# Patient Record
Sex: FEMALE | Race: WHITE | NOT HISPANIC OR LATINO | URBAN - METROPOLITAN AREA
[De-identification: names, ages, dates, MRNs, and addresses within clinical notes are randomized per-mention and may not be internally consistent; named-entity substitution may affect disease eponyms.]

---

## 2024-06-01 ENCOUNTER — HOSPITAL ENCOUNTER (INPATIENT)
Facility: HOSPITAL | Age: 54
LOS: 1 days | Discharge: PSYCHIATRIC OR SUBSTANCE USE FACILITY | DRG: 312 | End: 2024-06-04
Attending: STUDENT IN AN ORGANIZED HEALTH CARE EDUCATION/TRAINING PROGRAM | Admitting: INTERNAL MEDICINE
Payer: MEDICARE

## 2024-06-01 DIAGNOSIS — R07.9 CHEST PAIN: ICD-10-CM

## 2024-06-01 DIAGNOSIS — R55 SYNCOPE: ICD-10-CM

## 2024-06-01 DIAGNOSIS — R55 SYNCOPE AND COLLAPSE: Primary | ICD-10-CM

## 2024-06-01 LAB
ABORH RETYPE: NORMAL
ALBUMIN SERPL-MCNC: 3.5 G/DL (ref 3.5–5)
ALBUMIN/GLOB SERPL: 1.2 RATIO (ref 1.1–2)
ALP SERPL-CCNC: 86 UNIT/L (ref 40–150)
ALT SERPL-CCNC: 11 UNIT/L (ref 0–55)
ANION GAP SERPL CALC-SCNC: 9 MEQ/L
APTT PPP: 27.2 SECONDS (ref 23.2–33.7)
AST SERPL-CCNC: 14 UNIT/L (ref 5–34)
BASOPHILS # BLD AUTO: 0.02 X10(3)/MCL
BASOPHILS NFR BLD AUTO: 0.3 %
BILIRUB SERPL-MCNC: 0.5 MG/DL
BUN SERPL-MCNC: 15.4 MG/DL (ref 9.8–20.1)
CALCIUM SERPL-MCNC: 9 MG/DL (ref 8.4–10.2)
CHLORIDE SERPL-SCNC: 111 MMOL/L (ref 98–107)
CO2 SERPL-SCNC: 22 MMOL/L (ref 22–29)
CREAT SERPL-MCNC: 0.92 MG/DL (ref 0.55–1.02)
CREAT/UREA NIT SERPL: 17
EOSINOPHIL # BLD AUTO: 0.09 X10(3)/MCL (ref 0–0.9)
EOSINOPHIL NFR BLD AUTO: 1.6 %
ERYTHROCYTE [DISTWIDTH] IN BLOOD BY AUTOMATED COUNT: 13.6 % (ref 11.5–17)
ETHANOL SERPL-MCNC: <10 MG/DL
GFR SERPLBLD CREATININE-BSD FMLA CKD-EPI: >60 ML/MIN/1.73/M2
GLOBULIN SER-MCNC: 3 GM/DL (ref 2.4–3.5)
GLUCOSE SERPL-MCNC: 124 MG/DL (ref 74–100)
GROUP & RH: NORMAL
HCT VFR BLD AUTO: 41.7 % (ref 37–47)
HGB BLD-MCNC: 14.2 G/DL (ref 12–16)
IMM GRANULOCYTES # BLD AUTO: 0.01 X10(3)/MCL (ref 0–0.04)
IMM GRANULOCYTES NFR BLD AUTO: 0.2 %
INDIRECT COOMBS: NORMAL
INR PPP: 1.2
LACTATE SERPL-SCNC: 1.5 MMOL/L (ref 0.5–2.2)
LYMPHOCYTES # BLD AUTO: 1.08 X10(3)/MCL (ref 0.6–4.6)
LYMPHOCYTES NFR BLD AUTO: 18.7 %
MCH RBC QN AUTO: 30.9 PG (ref 27–31)
MCHC RBC AUTO-ENTMCNC: 34.1 G/DL (ref 33–36)
MCV RBC AUTO: 90.7 FL (ref 80–94)
MONOCYTES # BLD AUTO: 0.44 X10(3)/MCL (ref 0.1–1.3)
MONOCYTES NFR BLD AUTO: 7.6 %
NEUTROPHILS # BLD AUTO: 4.15 X10(3)/MCL (ref 2.1–9.2)
NEUTROPHILS NFR BLD AUTO: 71.6 %
NRBC BLD AUTO-RTO: 0 %
PLATELET # BLD AUTO: 224 X10(3)/MCL (ref 130–400)
PMV BLD AUTO: 9 FL (ref 7.4–10.4)
POTASSIUM SERPL-SCNC: 4 MMOL/L (ref 3.5–5.1)
PROT SERPL-MCNC: 6.5 GM/DL (ref 6.4–8.3)
PROTHROMBIN TIME: 15.2 SECONDS (ref 12.5–14.5)
RBC # BLD AUTO: 4.6 X10(6)/MCL (ref 4.2–5.4)
SODIUM SERPL-SCNC: 142 MMOL/L (ref 136–145)
SPECIMEN OUTDATE: NORMAL
WBC # SPEC AUTO: 5.79 X10(3)/MCL (ref 4.5–11.5)

## 2024-06-01 PROCEDURE — 96374 THER/PROPH/DIAG INJ IV PUSH: CPT

## 2024-06-01 PROCEDURE — 25500020 PHARM REV CODE 255: Performed by: STUDENT IN AN ORGANIZED HEALTH CARE EDUCATION/TRAINING PROGRAM

## 2024-06-01 PROCEDURE — 84484 ASSAY OF TROPONIN QUANT: CPT | Performed by: STUDENT IN AN ORGANIZED HEALTH CARE EDUCATION/TRAINING PROGRAM

## 2024-06-01 PROCEDURE — 86901 BLOOD TYPING SEROLOGIC RH(D): CPT | Performed by: STUDENT IN AN ORGANIZED HEALTH CARE EDUCATION/TRAINING PROGRAM

## 2024-06-01 PROCEDURE — 82077 ASSAY SPEC XCP UR&BREATH IA: CPT | Performed by: STUDENT IN AN ORGANIZED HEALTH CARE EDUCATION/TRAINING PROGRAM

## 2024-06-01 PROCEDURE — 96361 HYDRATE IV INFUSION ADD-ON: CPT

## 2024-06-01 PROCEDURE — 85025 COMPLETE CBC W/AUTO DIFF WBC: CPT | Performed by: STUDENT IN AN ORGANIZED HEALTH CARE EDUCATION/TRAINING PROGRAM

## 2024-06-01 PROCEDURE — 96375 TX/PRO/DX INJ NEW DRUG ADDON: CPT

## 2024-06-01 PROCEDURE — 83605 ASSAY OF LACTIC ACID: CPT | Performed by: STUDENT IN AN ORGANIZED HEALTH CARE EDUCATION/TRAINING PROGRAM

## 2024-06-01 PROCEDURE — 85610 PROTHROMBIN TIME: CPT | Performed by: STUDENT IN AN ORGANIZED HEALTH CARE EDUCATION/TRAINING PROGRAM

## 2024-06-01 PROCEDURE — 99285 EMERGENCY DEPT VISIT HI MDM: CPT | Mod: 25

## 2024-06-01 PROCEDURE — G0390 TRAUMA RESPONS W/HOSP CRITI: HCPCS

## 2024-06-01 PROCEDURE — 85730 THROMBOPLASTIN TIME PARTIAL: CPT | Performed by: STUDENT IN AN ORGANIZED HEALTH CARE EDUCATION/TRAINING PROGRAM

## 2024-06-01 PROCEDURE — 25000003 PHARM REV CODE 250: Performed by: STUDENT IN AN ORGANIZED HEALTH CARE EDUCATION/TRAINING PROGRAM

## 2024-06-01 PROCEDURE — 80053 COMPREHEN METABOLIC PANEL: CPT | Performed by: STUDENT IN AN ORGANIZED HEALTH CARE EDUCATION/TRAINING PROGRAM

## 2024-06-01 PROCEDURE — 93005 ELECTROCARDIOGRAM TRACING: CPT

## 2024-06-01 PROCEDURE — 83735 ASSAY OF MAGNESIUM: CPT | Performed by: INTERNAL MEDICINE

## 2024-06-01 PROCEDURE — 63600175 PHARM REV CODE 636 W HCPCS: Performed by: STUDENT IN AN ORGANIZED HEALTH CARE EDUCATION/TRAINING PROGRAM

## 2024-06-01 RX ORDER — ONDANSETRON HYDROCHLORIDE 2 MG/ML
4 INJECTION, SOLUTION INTRAVENOUS
Status: COMPLETED | OUTPATIENT
Start: 2024-06-01 | End: 2024-06-01

## 2024-06-01 RX ORDER — FENTANYL CITRATE 50 UG/ML
100 INJECTION, SOLUTION INTRAMUSCULAR; INTRAVENOUS
Status: COMPLETED | OUTPATIENT
Start: 2024-06-01 | End: 2024-06-01

## 2024-06-01 RX ORDER — SODIUM CHLORIDE 9 MG/ML
INJECTION, SOLUTION INTRAVENOUS
Status: COMPLETED | OUTPATIENT
Start: 2024-06-01 | End: 2024-06-01

## 2024-06-01 RX ADMIN — SODIUM CHLORIDE 1000 ML: 9 INJECTION, SOLUTION INTRAVENOUS at 09:06

## 2024-06-01 RX ADMIN — ONDANSETRON 4 MG: 2 INJECTION INTRAMUSCULAR; INTRAVENOUS at 11:06

## 2024-06-01 RX ADMIN — IOHEXOL 100 ML: 350 INJECTION, SOLUTION INTRAVENOUS at 10:06

## 2024-06-01 RX ADMIN — FENTANYL CITRATE 100 MCG: 50 INJECTION, SOLUTION INTRAMUSCULAR; INTRAVENOUS at 10:06

## 2024-06-02 LAB
AMPHET UR QL SCN: POSITIVE
AV INDEX (PROSTH): 0.64
AV MEAN GRADIENT: 4 MMHG
AV PEAK GRADIENT: 7 MMHG
AV VALVE AREA BY VELOCITY RATIO: 2.45 CM²
AV VALVE AREA: 2.42 CM²
AV VELOCITY RATIO: 0.65
BACTERIA #/AREA URNS AUTO: ABNORMAL /HPF
BARBITURATE SCN PRESENT UR: NEGATIVE
BENZODIAZ UR QL SCN: NEGATIVE
BILIRUB UR QL STRIP.AUTO: NEGATIVE
BSA FOR ECHO PROCEDURE: 1.78 M2
CANNABINOIDS UR QL SCN: NEGATIVE
CLARITY UR: CLEAR
COCAINE UR QL SCN: NEGATIVE
COLOR UR AUTO: ABNORMAL
CV ECHO LV RWT: 0.34 CM
DOP CALC AO PEAK VEL: 1.3 M/S
DOP CALC AO VTI: 27.8 CM
DOP CALC LVOT AREA: 3.8 CM2
DOP CALC LVOT DIAMETER: 2.2 CM
DOP CALC LVOT PEAK VEL: 0.84 M/S
DOP CALC LVOT STROKE VOLUME: 67.25 CM3
DOP CALC MV VTI: 25.2 CM
DOP CALCLVOT PEAK VEL VTI: 17.7 CM
E WAVE DECELERATION TIME: 180 MSEC
E/A RATIO: 0.94
E/E' RATIO: 7.24 M/S
ECHO LV POSTERIOR WALL: 0.82 CM (ref 0.6–1.1)
FENTANYL UR QL SCN: POSITIVE
FRACTIONAL SHORTENING: 22 % (ref 28–44)
GLUCOSE UR QL STRIP: NORMAL
HGB UR QL STRIP: NEGATIVE
HR MV ECHO: 77 BPM
INTERVENTRICULAR SEPTUM: 0.88 CM (ref 0.6–1.1)
KETONES UR QL STRIP: ABNORMAL
LEFT ATRIUM SIZE: 3.8 CM
LEFT INTERNAL DIMENSION IN SYSTOLE: 3.82 CM (ref 2.1–4)
LEFT VENTRICLE DIASTOLIC VOLUME INDEX: 64.74 ML/M2
LEFT VENTRICLE DIASTOLIC VOLUME: 112 ML
LEFT VENTRICLE MASS INDEX: 82 G/M2
LEFT VENTRICLE SYSTOLIC VOLUME INDEX: 36.2 ML/M2
LEFT VENTRICLE SYSTOLIC VOLUME: 62.7 ML
LEFT VENTRICULAR INTERNAL DIMENSION IN DIASTOLE: 4.89 CM (ref 3.5–6)
LEFT VENTRICULAR MASS: 141.43 G
LEUKOCYTE ESTERASE UR QL STRIP: 250
LV LATERAL E/E' RATIO: 5.85 M/S
LV SEPTAL E/E' RATIO: 9.5 M/S
LVOT MG: 1 MMHG
LVOT MV: 0.55 CM/S
MAGNESIUM SERPL-MCNC: 2.2 MG/DL (ref 1.6–2.6)
MDMA UR QL SCN: NEGATIVE
MUCOUS THREADS URNS QL MICRO: ABNORMAL /LPF
MV MEAN GRADIENT: 2 MMHG
MV PEAK A VEL: 0.81 M/S
MV PEAK E VEL: 0.76 M/S
MV PEAK GRADIENT: 4 MMHG
MV STENOSIS PRESSURE HALF TIME: 83 MS
MV VALVE AREA BY CONTINUITY EQUATION: 2.67 CM2
MV VALVE AREA P 1/2 METHOD: 2.65 CM2
NITRITE UR QL STRIP: NEGATIVE
OHS LV EJECTION FRACTION SIMPSONS BIPLANE MOD: 50 %
OPIATES UR QL SCN: NEGATIVE
PCP UR QL: NEGATIVE
PH UR STRIP: 7 [PH]
PH UR: 7 [PH] (ref 3–11)
PROT UR QL STRIP: NEGATIVE
RA PRESSURE ESTIMATED: 3 MMHG
RBC #/AREA URNS AUTO: ABNORMAL /HPF
SINUS: 3 CM
SP GR UR STRIP.AUTO: >1.05 (ref 1–1.03)
SPECIFIC GRAVITY, URINE AUTO (.000) (OHS): >1.05 (ref 1–1.03)
SQUAMOUS #/AREA URNS LPF: ABNORMAL /HPF
TDI LATERAL: 0.13 M/S
TDI SEPTAL: 0.08 M/S
TDI: 0.11 M/S
TRICUSPID ANNULAR PLANE SYSTOLIC EXCURSION: 2.06 CM
TROPONIN I SERPL-MCNC: <0.01 NG/ML (ref 0–0.04)
UROBILINOGEN UR STRIP-ACNC: NORMAL
WBC #/AREA URNS AUTO: ABNORMAL /HPF
Z-SCORE OF LEFT VENTRICULAR DIMENSION IN END DIASTOLE: 0.2
Z-SCORE OF LEFT VENTRICULAR DIMENSION IN END SYSTOLE: 2

## 2024-06-02 PROCEDURE — 80307 DRUG TEST PRSMV CHEM ANLYZR: CPT | Performed by: STUDENT IN AN ORGANIZED HEALTH CARE EDUCATION/TRAINING PROGRAM

## 2024-06-02 PROCEDURE — 63600175 PHARM REV CODE 636 W HCPCS: Performed by: INTERNAL MEDICINE

## 2024-06-02 PROCEDURE — 63600175 PHARM REV CODE 636 W HCPCS: Performed by: NURSE PRACTITIONER

## 2024-06-02 PROCEDURE — 87086 URINE CULTURE/COLONY COUNT: CPT | Performed by: STUDENT IN AN ORGANIZED HEALTH CARE EDUCATION/TRAINING PROGRAM

## 2024-06-02 PROCEDURE — 81001 URINALYSIS AUTO W/SCOPE: CPT | Mod: XB | Performed by: STUDENT IN AN ORGANIZED HEALTH CARE EDUCATION/TRAINING PROGRAM

## 2024-06-02 PROCEDURE — G0378 HOSPITAL OBSERVATION PER HR: HCPCS

## 2024-06-02 PROCEDURE — 25000003 PHARM REV CODE 250: Performed by: INTERNAL MEDICINE

## 2024-06-02 PROCEDURE — 25000003 PHARM REV CODE 250: Performed by: NURSE PRACTITIONER

## 2024-06-02 PROCEDURE — 87040 BLOOD CULTURE FOR BACTERIA: CPT | Performed by: INTERNAL MEDICINE

## 2024-06-02 RX ORDER — ACETAMINOPHEN 500 MG
1000 TABLET ORAL EVERY 6 HOURS PRN
Status: DISCONTINUED | OUTPATIENT
Start: 2024-06-02 | End: 2024-06-04 | Stop reason: HOSPADM

## 2024-06-02 RX ORDER — CLONIDINE HYDROCHLORIDE 0.1 MG/1
0.1 TABLET ORAL 2 TIMES DAILY
Status: DISCONTINUED | OUTPATIENT
Start: 2024-06-02 | End: 2024-06-03

## 2024-06-02 RX ORDER — IBUPROFEN 200 MG
16 TABLET ORAL
Status: DISCONTINUED | OUTPATIENT
Start: 2024-06-02 | End: 2024-06-04 | Stop reason: HOSPADM

## 2024-06-02 RX ORDER — MIRTAZAPINE 15 MG/1
15 TABLET, FILM COATED ORAL NIGHTLY PRN
Status: DISCONTINUED | OUTPATIENT
Start: 2024-06-02 | End: 2024-06-04 | Stop reason: HOSPADM

## 2024-06-02 RX ORDER — TALC
6 POWDER (GRAM) TOPICAL NIGHTLY PRN
Status: DISCONTINUED | OUTPATIENT
Start: 2024-06-02 | End: 2024-06-04 | Stop reason: HOSPADM

## 2024-06-02 RX ORDER — ENOXAPARIN SODIUM 100 MG/ML
40 INJECTION SUBCUTANEOUS EVERY 24 HOURS
Status: DISCONTINUED | OUTPATIENT
Start: 2024-06-02 | End: 2024-06-02

## 2024-06-02 RX ORDER — LUMATEPERONE 21 MG/1
21 CAPSULE ORAL
COMMUNITY

## 2024-06-02 RX ORDER — ASPIRIN 325 MG/1
100 TABLET, FILM COATED ORAL DAILY
COMMUNITY

## 2024-06-02 RX ORDER — CLOPIDOGREL BISULFATE 75 MG/1
75 TABLET ORAL DAILY
COMMUNITY

## 2024-06-02 RX ORDER — ALUMINUM HYDROXIDE, MAGNESIUM HYDROXIDE, AND SIMETHICONE 1200; 120; 1200 MG/30ML; MG/30ML; MG/30ML
30 SUSPENSION ORAL 4 TIMES DAILY PRN
Status: DISCONTINUED | OUTPATIENT
Start: 2024-06-02 | End: 2024-06-04 | Stop reason: HOSPADM

## 2024-06-02 RX ORDER — NALOXONE HCL 0.4 MG/ML
0.02 VIAL (ML) INJECTION
Status: DISCONTINUED | OUTPATIENT
Start: 2024-06-02 | End: 2024-06-04 | Stop reason: HOSPADM

## 2024-06-02 RX ORDER — ONDANSETRON HYDROCHLORIDE 2 MG/ML
4 INJECTION, SOLUTION INTRAVENOUS EVERY 4 HOURS PRN
Status: DISCONTINUED | OUTPATIENT
Start: 2024-06-02 | End: 2024-06-04 | Stop reason: HOSPADM

## 2024-06-02 RX ORDER — DIVALPROEX SODIUM 250 MG/1
250 TABLET, DELAYED RELEASE ORAL 2 TIMES DAILY
Status: DISCONTINUED | OUTPATIENT
Start: 2024-06-02 | End: 2024-06-04 | Stop reason: HOSPADM

## 2024-06-02 RX ORDER — THIAMINE HCL 100 MG
100 TABLET ORAL DAILY
Status: DISCONTINUED | OUTPATIENT
Start: 2024-06-02 | End: 2024-06-04 | Stop reason: HOSPADM

## 2024-06-02 RX ORDER — GLUCAGON 1 MG
1 KIT INJECTION
Status: DISCONTINUED | OUTPATIENT
Start: 2024-06-02 | End: 2024-06-04 | Stop reason: HOSPADM

## 2024-06-02 RX ORDER — DOXEPIN HYDROCHLORIDE 25 MG/1
25 CAPSULE ORAL NIGHTLY
COMMUNITY

## 2024-06-02 RX ORDER — DIVALPROEX SODIUM 250 MG/1
250 TABLET, DELAYED RELEASE ORAL 2 TIMES DAILY
COMMUNITY

## 2024-06-02 RX ORDER — CLONIDINE HYDROCHLORIDE 0.1 MG/1
0.05 TABLET ORAL 2 TIMES DAILY
COMMUNITY

## 2024-06-02 RX ORDER — SODIUM CHLORIDE 0.9 % (FLUSH) 0.9 %
10 SYRINGE (ML) INJECTION
Status: DISCONTINUED | OUTPATIENT
Start: 2024-06-02 | End: 2024-06-04 | Stop reason: HOSPADM

## 2024-06-02 RX ORDER — BISACODYL 10 MG/1
10 SUPPOSITORY RECTAL DAILY PRN
Status: DISCONTINUED | OUTPATIENT
Start: 2024-06-02 | End: 2024-06-04 | Stop reason: HOSPADM

## 2024-06-02 RX ORDER — HYDROCODONE BITARTRATE AND ACETAMINOPHEN 5; 325 MG/1; MG/1
1 TABLET ORAL EVERY 8 HOURS PRN
Status: DISCONTINUED | OUTPATIENT
Start: 2024-06-02 | End: 2024-06-04 | Stop reason: HOSPADM

## 2024-06-02 RX ORDER — MULTIVITAMIN
1 TABLET ORAL DAILY
COMMUNITY

## 2024-06-02 RX ORDER — OLANZAPINE 5 MG/1
10 TABLET, ORALLY DISINTEGRATING ORAL
Status: DISCONTINUED | OUTPATIENT
Start: 2024-06-02 | End: 2024-06-04 | Stop reason: HOSPADM

## 2024-06-02 RX ORDER — IBUPROFEN 200 MG
24 TABLET ORAL
Status: DISCONTINUED | OUTPATIENT
Start: 2024-06-02 | End: 2024-06-04 | Stop reason: HOSPADM

## 2024-06-02 RX ORDER — HYDROXYZINE PAMOATE 25 MG/1
50 CAPSULE ORAL EVERY 4 HOURS PRN
Status: DISCONTINUED | OUTPATIENT
Start: 2024-06-02 | End: 2024-06-04 | Stop reason: HOSPADM

## 2024-06-02 RX ORDER — AMOXICILLIN 250 MG
1 CAPSULE ORAL 2 TIMES DAILY PRN
Status: DISCONTINUED | OUTPATIENT
Start: 2024-06-02 | End: 2024-06-04 | Stop reason: HOSPADM

## 2024-06-02 RX ORDER — DOXEPIN HYDROCHLORIDE 25 MG/1
25 CAPSULE ORAL NIGHTLY
Status: DISCONTINUED | OUTPATIENT
Start: 2024-06-02 | End: 2024-06-04 | Stop reason: HOSPADM

## 2024-06-02 RX ADMIN — CEFTRIAXONE SODIUM 1 G: 1 INJECTION, POWDER, FOR SOLUTION INTRAMUSCULAR; INTRAVENOUS at 01:06

## 2024-06-02 RX ADMIN — ACETAMINOPHEN 1000 MG: 500 TABLET ORAL at 08:06

## 2024-06-02 RX ADMIN — DOXEPIN HYDROCHLORIDE 25 MG: 25 CAPSULE ORAL at 09:06

## 2024-06-02 RX ADMIN — ONDANSETRON 4 MG: 2 INJECTION INTRAMUSCULAR; INTRAVENOUS at 02:06

## 2024-06-02 RX ADMIN — ONDANSETRON 4 MG: 2 INJECTION INTRAMUSCULAR; INTRAVENOUS at 09:06

## 2024-06-02 RX ADMIN — DIVALPROEX SODIUM 250 MG: 250 TABLET, DELAYED RELEASE ORAL at 08:06

## 2024-06-02 RX ADMIN — THIAMINE HCL TAB 100 MG 100 MG: 100 TAB at 08:06

## 2024-06-02 RX ADMIN — DIVALPROEX SODIUM 250 MG: 250 TABLET, DELAYED RELEASE ORAL at 09:06

## 2024-06-02 RX ADMIN — HYDROCODONE BITARTRATE AND ACETAMINOPHEN 1 TABLET: 5; 325 TABLET ORAL at 09:06

## 2024-06-02 RX ADMIN — CLONIDINE HYDROCHLORIDE 0.1 MG: 0.1 TABLET ORAL at 08:06

## 2024-06-02 RX ADMIN — ONDANSETRON 4 MG: 2 INJECTION INTRAMUSCULAR; INTRAVENOUS at 07:06

## 2024-06-02 RX ADMIN — CLONIDINE HYDROCHLORIDE 0.1 MG: 0.1 TABLET ORAL at 09:06

## 2024-06-02 RX ADMIN — THERA TABS 1 TABLET: TAB at 08:06

## 2024-06-02 RX ADMIN — HYDROCODONE BITARTRATE AND ACETAMINOPHEN 1 TABLET: 5; 325 TABLET ORAL at 01:06

## 2024-06-02 NOTE — CONSULTS
"   Trauma Surgery   Activation Note    Patient Name: Kourtney Conley  MRN: [id number]   YOB: 1970  Date: 06/01/2024    LEVEL 2 TRAUMA     Subjective:   History of present illness: 54 year old female with history of PE presents as level 2 trauma activation after fall while on plavix. Per EMS patient stood to get her medications and lost consciousness. She fell face forward at ground level. She was found on the floor unresponsive for 2 minutes. The patient does not recall the mechanism of the fall. She reports associated neck pain, shoulder pain, headache, dizziness and back pain. C collar applied and patient received zofran on route.     Primary Survey:  A patent   B spontaneous   C Palpable pulses    D GCS 15(E 4, V 5, M 6)    E exposed, log-rolled and examined (see below)   F See below     VITAL SIGNS: 24 HR MIN & MAX LAST   Temp  Min: 98.5 °F (36.9 °C)  Max: 98.5 °F (36.9 °C)  98.5 °F (36.9 °C)   BP  Min: 124/82  Max: 132/86  124/82    Pulse  Min: 90  Max: 100  90    Resp  Min: 18  Max: 27  20    SpO2  Min: 98 %  Max: 99 %  98 %      HT: 5' 1" (154.9 cm)  WT: 73.9 kg (163 lb)  BMI: 30.8     FAST: deferred    Scheduled Meds:  Continuous Infusions:   sodium chloride 0.9%   Intravenous Code/Trauma/sedation Continuous Med 1,000 mL/hr at 06/01/24 2151 1,000 mL at 06/01/24 2151     PRN Meds:  Current Facility-Administered Medications:     sodium chloride 0.9%, , Intravenous, Code/Trauma/sedation Continuous Med    ROS: +back pain, myalgias, neck pain, dizziness, syncope, weakness, lightheaded, headache    Allergies: toradol, morphine, shellfish, sulfa, tramadol  PMH: PE  PSH: cervical fusion, cholecystectomy  Social history: Noncontributory  Objective:   Secondary Survey:   General: NAD  Neuro: GCS 15  HEENT:  hematoma to right forehead, PERRLA, c-collar  CV:  RR  Pulse: 2+ RP b/l, 2+ DP b/l   Resp/chest:  Non-labored breathing  GI:  Abdomen soft, non-tender, non-distended  :  deferred   Rectal: " deferred  Extremities: Moves all 4 spontaneously and purposefully, no obvious gross deformities.  Back/Spine: cervical and thoracic tenderness, no step offs or deformities. No lumbar spine tenderness, step off or deformity.   Skin/wounds:  Warm, well perfused    Labs:  WBC 5, H/H 14/41, Plt 224  INR 1.2  K 4, BUN:Cr 15/0.92  ETOH < 10  LA 1.5      Imaging:  XR pelvis:  Several metallic densities overlying the pelvis.  No fractures are seen     CXR:   No acute disease is seen     CT chest/AP:  1. No acute traumatic intrathoracic pathology identified. No acute traumatic intraabdominal or pelvic solid organ or bowel pathology identified. Details and other findings as discussed above.     CT cervical:  1. No acute cervical spine fracture dislocation or subluxation is seen.     CTH:  1. There is mild soft tissue swelling with hyperdense component in the right frontal scalp and supraorbital region denoting contusion hematoma.   2. No acute intracranial traumatic injury identified.   Assessment & Plan:   54 year old female that presents as level 2 trauma activation for fall on plavix after syncopal event. -The patient is afebrile and HDS. Labs/imaging reviewed. She has no traumatic injuries and no indication for admission to trauma service.   -Dispo per ED; to be admitted by medicine service for syncope workup.     Smooth Dc MD  [employer] General Surgery PGY-4

## 2024-06-02 NOTE — H&P
[facility] Hospital Medicine History & Physical Examination       Patient Name: Claudia Villasenor  MRN: [id number]  Patient Class: OP- Observation   Admission Date: 06/02/2024   Admitting Service: Hospital Medicine   Length of Stay: 0  Attending Physician: Dr. Baltazar   Primary Care Provider: No primary care provider on file.  Face-to-Face encounter date: 06/02/2024  Code Status: Full  Chief Complaint: No chief complaint on file.      Patient information was obtained from patient, patient's family, past medical records and ER records.    HISTORY OF PRESENT ILLNESS:   Claudia Villasenor is a 54 y.o. female with a PMHx of polysubstance abuse including methamphetamine and heroin, chronic neck pain, RLS, thyroid cancer s/p partial thyroidectomy, migraine and history of PE who presented to [facility] on 6/1/2024 via EMS from [facility] as a level 2 trauma activation following a fall that occurred prior to arrival.  Patient reportedly standing trying to get to her night medications when she spontaneously lost consciousness and fell face forward, unresponsive for 2 minutes.  Patient endorsed neck pain, shoulder pain, headache, dizziness and back pain following the fall.  Noted to be on Plavix.    Vital Signs upon presentation to the ED included /86, , RR 27, SpO2 99% on room air, temperature 98.5° F.  Labs notable for chloride 111, glucose 124.  Troponin undetectable, serum alcohol undetectable.  Lactic acid unremarkable.  CT head without contrast demonstrated mild soft tissue swelling with hyperdense component in the right frontal scalp and supraorbital region done noting contusion hematoma, no acute intracranial traumatic injury identified.  CT C-spine without contrast unremarkable.  CXR unremarkable.  Pelvic x-ray demonstrated similar overall metallic densities overlying the pelvis, no fracture seen.  CT chest abdomen and pelvis with IV contrast unremarkable.  She was seen  fentanyl 100 mcg IV, Zofran 4 mg IV and IV fluids in the ED. she was evaluated by Trauma Service and deemed appropriate for Medicine admission.  Admitted to hospital medicine service for further workup and management of care.    REVIEW OF SYSTEMS:   Except as documented, all other systems reviewed and negative.    PAST MEDICAL HISTORY:   History of polysubstance abuse  Chronic neck pain  RLS   History of thyroid cancer s/p partial thyroidectomy  Migraine   History of PE    PAST SURGICAL HISTORY:   left knee replacement  Cervical spine surgery x5   Partial thyroidectomy   Bilateral carpal tunnel release   Cholecystectomy  Appendectomy   Bilateral inguinal hernia repair    FAMILY HISTORY:   Mother: Bipolar disorder, depression   Dad:  Polysubstance abuse, alcoholism, PTSD and depression    SOCIAL HISTORY:   Denied alcohol, tobacco. Heroin and meth use.     SCREENING FOR SOCIAL DRIVERS FOR HEALTH:   Patient screened for food insecurity, housing instability, transportation needs, utility difficulties, and interpersonal safety (select all that apply as identified as concern):  [x]Housing or Food  []Transportation Needs  []Utility Difficulties  []Interpersonal safety  []None    ALLERGIES:   Ketorolac, Morpholine analogues, Shellfish containing products, Sulfa (sulfonamide antibiotics), and Tramadol    HOME MEDICATIONS:     Prior to Admission medications    Medication Sig Start Date End Date Taking? Authorizing Provider   cloNIDine (CATAPRES) 0.1 MG tablet Take 0.05 mg by mouth 2 (two) times daily.    Provider, Historical   clopidogreL (PLAVIX) 75 mg tablet Take 75 mg by mouth once daily.    Provider, Historical   divalproex (DEPAKOTE) 250 MG EC tablet Take 250 mg by mouth 2 (two) times a day.    Provider, Historical   doxepin (SINEQUAN) 25 MG capsule Take 25 mg by mouth every evening.    Provider, Historical   lumateperone (CAPLYTA) 21 mg Cap Take 21 mg by mouth.    Provider, Historical   multivitamin (THERAGRAN) per  tablet Take 1 tablet by mouth once daily.    Provider, Historical   thiamine mononitrate, vit B1, (VITAMIN B-1, MONONITRATE,) 100 mg Tab Take 100 mg by mouth once daily.    Provider, Historical     ________________________________________________________________________  INPATIENT LIST OF MEDICATIONS     Current Facility-Administered Medications:     acetaminophen tablet 1,000 mg, 1,000 mg, Oral, Q6H PRN, Renetta Rodriguez, LENA    aluminum-magnesium hydroxide-simethicone 200-200-20 mg/5 mL suspension 30 mL, 30 mL, Oral, QID PRN, Renetta Rodriguez, LENA    bisacodyL suppository 10 mg, 10 mg, Rectal, Daily PRN, Renetta Rodriguez, LENA    cloNIDine tablet 0.1 mg, 0.1 mg, Oral, BID, Reentta Rodriguez FNP    dextrose 10% bolus 125 mL 125 mL, 12.5 g, Intravenous, PRN, Renetta Rodriguez, FNP    dextrose 10% bolus 250 mL 250 mL, 25 g, Intravenous, PRN, Renetta Rodriguez FNP    divalproex EC tablet 250 mg, 250 mg, Oral, BID, Renetta Rodriguez FNP    doxepin capsule 25 mg, 25 mg, Oral, QHS, Renetta Rodriguez FNP    enoxaparin injection 40 mg, 40 mg, Subcutaneous, Q24H (prophylaxis, 1700), Renetta Rodriguez FNP    glucagon (human recombinant) injection 1 mg, 1 mg, Intramuscular, PRN, Renetta Rodriguez FNP    glucose chewable tablet 16 g, 16 g, Oral, PRN, Renetta Rodriguez, LENA    glucose chewable tablet 24 g, 24 g, Oral, PRN, Renetta Rodriguez, LENA    hydrOXYzine pamoate capsule 50 mg, 50 mg, Oral, Q4H PRN, Renetta Rodriguez, LENA    lumateperone Cap 21 mg, 21 mg, Oral, Daily, Renetta Rodriguez FNP    melatonin tablet 6 mg, 6 mg, Oral, Nightly PRN, Renetta Rodriguez FNP    mirtazapine tablet 15 mg, 15 mg, Oral, Nightly PRN, Renetta Rodriguez FNP    multivitamin tablet, 1 tablet, Oral, Daily, Renetta Rodriguez FNP    naloxone 0.4 mg/mL injection 0.02 mg, 0.02 mg, Intravenous, PRN, Renetta Rodriguez FNP    OLANZapine zydis disintegrating tablet 10 mg, 10 mg, Oral, Q2H PRN, Renetta Rodriguez FNP    ondansetron injection  4 mg, 4 mg, Intravenous, Q4H PRN, Meet Rodriguezy L, FNP, 4 mg at 06/02/24 0720    senna-docusate 8.6-50 mg per tablet 1 tablet, 1 tablet, Oral, BID PRN, Molbert, Renetta L, FNP    sodium chloride 0.9% flush 10 mL, 10 mL, Intravenous, PRN, Molbert, Renetta L, FNP    thiamine tablet 100 mg, 100 mg, Oral, Daily, Molbert, Renetta L, FNP    Current Outpatient Medications:     cloNIDine (CATAPRES) 0.1 MG tablet, Take 0.05 mg by mouth 2 (two) times daily., Disp: , Rfl:     clopidogreL (PLAVIX) 75 mg tablet, Take 75 mg by mouth once daily., Disp: , Rfl:     divalproex (DEPAKOTE) 250 MG EC tablet, Take 250 mg by mouth 2 (two) times a day., Disp: , Rfl:     doxepin (SINEQUAN) 25 MG capsule, Take 25 mg by mouth every evening., Disp: , Rfl:     lumateperone (CAPLYTA) 21 mg Cap, Take 21 mg by mouth., Disp: , Rfl:     multivitamin (THERAGRAN) per tablet, Take 1 tablet by mouth once daily., Disp: , Rfl:     thiamine mononitrate, vit B1, (VITAMIN B-1, MONONITRATE,) 100 mg Tab, Take 100 mg by mouth once daily., Disp: , Rfl:     Scheduled Meds:   cloNIDine  0.1 mg Oral BID    divalproex  250 mg Oral BID    doxepin  25 mg Oral QHS    enoxparin  40 mg Subcutaneous Q24H (prophylaxis, 1700)    lumateperone  21 mg Oral Daily    multivitamin  1 tablet Oral Daily    thiamine  100 mg Oral Daily     Continuous Infusions:  PRN Meds:.  Current Facility-Administered Medications:     acetaminophen, 1,000 mg, Oral, Q6H PRN    aluminum-magnesium hydroxide-simethicone, 30 mL, Oral, QID PRN    bisacodyL, 10 mg, Rectal, Daily PRN    dextrose 10%, 12.5 g, Intravenous, PRN    dextrose 10%, 25 g, Intravenous, PRN    glucagon (human recombinant), 1 mg, Intramuscular, PRN    glucose, 16 g, Oral, PRN    glucose, 24 g, Oral, PRN    hydrOXYzine pamoate, 50 mg, Oral, Q4H PRN    melatonin, 6 mg, Oral, Nightly PRN    mirtazapine, 15 mg, Oral, Nightly PRN    naloxone, 0.02 mg, Intravenous, PRN    OLANZapine zydis, 10 mg, Oral, Q2H PRN    ondansetron, 4 mg,  Intravenous, Q4H PRN    senna-docusate 8.6-50 mg, 1 tablet, Oral, BID PRN    sodium chloride 0.9%, 10 mL, Intravenous, PRN    PHYSICAL EXAM:     VITAL SIGNS: 24 HRS MIN & MAX LAST   Temp  Min: 98.4 °F (36.9 °C)  Max: 98.5 °F (36.9 °C) 98.4 °F (36.9 °C)   BP  Min: 123/77  Max: 150/87 (!) 131/95   Pulse  Min: 70  Max: 100  72   Resp  Min: 13  Max: 28 (!) 22   SpO2  Min: 97 %  Max: 100 % 100 %       General appearance: Well-developed female in no apparent distress.  HENT: Right forehead and orbital hematoma  Eyes: Normal extraocular movements.   Neck: Supple.   Lungs: Clear to auscultation bilaterally.   Heart: Regular rate and rhythm. S1 and S2 present. No pedal edema.  Abdomen: Soft, non-distended, non-tender.  Extremities: No cyanosis, clubbing, or edema.  Skin: No Rash.   Neuro: Motor and sensory exams grossly intact.   Psych/mental status: Awake and alert. Appropriate mood and affect. Responds appropriately to questions.     LABS AND IMAGING:     Recent Labs   Lab 06/01/24  2151   WBC 5.79   RBC 4.60   HGB 14.2   HCT 41.7   MCV 90.7   MCH 30.9   MCHC 34.1   RDW 13.6      MPV 9.0       Recent Labs   Lab 06/01/24  2151      K 4.0   *   CO2 22   BUN 15.4   CREATININE 0.92   CALCIUM 9.0   ALBUMIN 3.5   ALKPHOS 86   ALT 11   AST 14   BILITOT 0.5       Microbiology Results (last 7 days)       ** No results found for the last 168 hours. **             X-Ray Pelvis Routine AP  Narrative: EXAMINATION:  XR PELVIS ROUTINE AP    CLINICAL HISTORY:  r/o bleeding or hemorrhage;    TECHNIQUE:  AP view of the pelvis was performed.    COMPARISON:  None.    FINDINGS:  There are no fractures seen.  There is no dislocation.  There is several metallic densities overlying the pelvis.  I do not know if these are in or on the patient.  Impression: Several metallic densities overlying the pelvis.  No fractures are seen    Electronically signed by: Jose Ontiveros MD  Date:    06/01/2024  Time:    22:54  X-Ray Chest 1  View  Narrative: EXAMINATION:  XR CHEST 1 VIEW    CLINICAL HISTORY:  r/o bleeding or hemorrhage;    TECHNIQUE:  Single frontal view of the chest was performed.    COMPARISON:  None    FINDINGS:  Lungs are clear.  Heart size is within normal limits.  Costophrenic angles are clear.  Impression: No acute disease is seen    Electronically signed by: Jose Ontiveros MD  Date:    06/01/2024  Time:    22:47  CT Chest Abdomen Pelvis With IV Contrast (XPD) NO Oral Contrast  START OF REPORT:  Technique: CT Scan of the chest abdomen and pelvis was performed with intravenous contrast with axial as well as sagittal and, coronal images.    Dosage Information: Automated Exposure Control was utilized.    Comparison: None.    Clinical History: Lv 2 trauma- GL fall, + thinners, head, neck pain.    Findings:  Soft Tissues: Unremarkable.  Lines and Tubes: None.  Neck: The visualized soft tissues of the neck appear unremarkable. The left thyroid gland is not identified. Correlate with clinical findings and prior surgical intervention. There is a small hypodense nodule in the right thyroid lobe (series 2 image 7). Correlate with clinical and laboratory findings as regards additional evaluation such as ultrasound.  Mediastinum: The mediastinal structures are within normal limits.  Heart: The heart size is within normal limits.  Aorta: Unremarkable appearing aorta. No aortic dissection or aneurysm is seen.  Lungs: Subtle streaky linear opacity is seen predominantly in the dependent portions at the lung bases consistent with scarring and subsegmental atelectasis. Otherwise clear lungs with no areas of focal infiltrate or consolidation.  Pleura: No effusions or pneumothorax are identified.  Bony Structures:  Spine: Spondylolytic changes are seen in the thoracic spine.  Ribs: No rib fractures are identified.  Abdomen:  Abdominal Wall: No abdominal wall pathology is seen.  Liver: The liver appears unremarkable.  Biliary System: The extra hepatic  biliary system appears prominent which may reflect prior obstructive physiology in this patient status post cholecystectomy.  Gallbladder: Surgical clips are seen in the gallbladder fossa which may reflect prior cholecystectomy.  Pancreas: The pancreas appears unremarkable.  Spleen: The spleen appears unremarkable.  Adrenals: The adrenal glands appear unremarkable.  Kidneys: The kidneys appear unremarkable with no stones cysts masses or hydronephrosis with IV contrast decreasing sensitivity and specificity for stones.  Aorta: The abdominal aorta appears unremarkable.  IVC: Unremarkable.  Bowel:  Esophagus: The visualized esophagus appears unremarkable.  Stomach: The stomach appears unremarkable.  Duodenum: Unremarkable appearing duodenum.  Small Bowel: The small bowel appears unremarkable.  Colon: Nondistended.  Appendix: The appendix is not identified but no inflammatory changes are seen in the right lower quadrant to suggest appendicitis.  Peritoneum: No intraperitoneal free air or ascites is seen.    Pelvis:  Bladder: The bladder is nondistended but appears otherwise unremarkable.  Female:  Uterus: The uterus appears unremarkable for age.  Ovaries: No adnexal masses are seen.    Bony structures:  Dorsal Spine: There is spondylosis of the visualized dorsal spine.  Bony Pelvis: The visualized bony structures of the pelvis appear unremarkable.    Miscellaneous: Note is made of an orthopedic hardwarde in the lower cervical region.    Impression:  1. No acute traumatic intrathoracic pathology identified. No acute traumatic intraabdominal or pelvic solid organ or bowel pathology identified. Details and other findings as discussed above.  CT Cervical Spine Without Contrast  START OF REPORT:  Technique: CT of the cervical spine was performed without intravenous contrast with axial as well as sagittal and coronal images.    Comparison: None.    Dosage Information: Automated exposure control was utilized.    Clinical  history: Lv 2 trauma- GL fall, + thinners, head, neck pain.    Findings:  Lung apices: Chest CT findings discussed separately.  Spine:  Spinal canal: The spinal canal appears unremarkable.  Mineralization: Within normal limits for age.  Rotation: No significant rotation is seen.  Scoliosis: No significant scoliosis is seen.  Listhesis: No significant listhesis is identified.  Lordosis: The cervical lordosis is maintained.  Intervertebral disc spaces: The rest of the intervertebral discs are preserved.  Osteophytes: Moderate multilevel endplate osteophytes are seen.  Endplate Sclerosis: No significant endplate sclerosis is seen.  Uncovertebral degenerative changes: Mild multilevel uncovertebral joint arthrosis is seen.  Facet degenerative changes: Mild multilevel facet degenerative changes are seen.  Calcifications: None.  Fractures: No acute cervical spine fracture dislocation or subluxation is seen.  Orthopedic Hardware: There is anterior orthopedic cervical fusion from C3 to C7.    Miscellaneous:  Mastoid air cells: The visualized mastoid air cells appear clear.  Soft Tissues: Unremarkable.    Impression:  1. No acute cervical spine fracture dislocation or subluxation is seen.  2. Degenerative and postsurgical changes and other details as above. Details and other findings as noted above.  CT Head Without Contrast  START OF REPORT:  Technique: CT of the head was performed without intravenous contrast with direct axial as well as coronal and sagittal reconstruction images.    Comparison: None.    Clinical history: Lv 2 trauma- GL fall, + thinners, head, neck pain.    FINDINGS:  Hemorrhage: No acute intracranial hemorrhage is seen.  CSF spaces: The ventricles, sulci and basal cisterns all appear mildly prominent global cerebral atrophy.  Brain parenchyma: Unremarkable with preservation of the grey white junction throughout.  Cerebellum: Unremarkable.  Vascular: Unremarkable venous sinuses. Subtle scattered  atheromatous calcification of the intracranial arteries is seen.  Sella and skull base: Unremarkable.  Cerebellopontine angles: Normal.  Herniation: None.  Intracranial calcifications: Incidental note is made of bilateral choroid plexus calcification. Incidental note is made of some pineal region calcification.  Calvarium: No acute linear or depressed skull fracture is seen.  Scalp: There is mild soft tissue swelling with hyperdense component in the right frontal scalp and supraorbital region denoting contusion hematoma.    Maxillofacial Structures:  Paranasal sinuses: The visualized paranasal sinuses appear clear with no mucoperiosteal thickening or air fluid levels identified.  Orbits: Unremarkable.  Zygomatic arches: Unremarkable.  Temporal bones and mastoids: Unremarkable.  TMJ: The mandibular condyles appear normally placed with respect to the mandibular fossa.    Impression:  1. There is mild soft tissue swelling with hyperdense component in the right frontal scalp and supraorbital region denoting contusion hematoma.  2. No acute intracranial traumatic injury identified. Details and other findings as noted above.        ASSESSMENT & PLAN:   Syncope and collapse   Facial contusions and hematoma s/p ground level fall     History:  History of polysubstance abuse, Chronic neck pain, RLS, History of thyroid cancer s/p partial thyroidectomy, Migraine, History of PE    Plan:  Fall precautions   Cardiac monitoring  Echocardiogram and bilateral carotid ultrasound pending  Orthostatic VS  UA and UDS pending   Resume home medications as deemed appropriate once medication reconciliation is updated  Labs in AM    VTE Prophylaxis: SCDs    Discharge Planning and Disposition: GLENROY POSADAS, Rebekah Weathers NP have reviewed and discussed the case with Dr. Baltazar.  Please see the attending MD's addendum for further assessment and plan.    Rebekah Weathers [facility]  Department of [facility] Medicine   [facility]  [facility]   06/02/2024    This note was created with the assistance of [employer] Voice Recognition Software. There may be transcription errors as a result of using this technology, however minimal and effort has been made to assure accuracy of transcription, but any obvious errors or omissions should be clarified with the author of the document.    _______________________________________________________________________________  MD Addendum:  I, Dr. Baltazar, assumed care of this patient today   For the patient encounter, I performed the substantive portion of the visit, I reviewed the NP/PA documentation, treatment plan, and medical decision making.  I had face to face time with this patient     A. History:  54-year-old female with past medical history stated as above presenting from  behavioral health as level 2 trauma following a fall, there was a concern for loss of consciousness and being unresponsive.  UDS positive for amphetamines, fentanyl.  UA abnormal.  Rest of the labs look okay.  Imaging reports reviewed.FULL CODE    B. Physical exam:  Agree with the physical exam as above.  In no acute distress, hematoma of the forehead, chest, heart, abdomen, non concerning, able to move all extremities spontaneously.    C. Medical decision making:  Fall.    Facial contusions and hematoma from fall  Syncope and reported collapse  Polysubstance use  UTI  PMHx of polysubstance abuse including methamphetamine and heroin, chronic neck pain, RLS, thyroid cancer s/p partial thyroidectomy, migraine and history of PE    Plan   Analgesics p.r.n. fall precautions. Hold antiplatelets/anticoags if any, can resume in 3-5 days if hematoma stable  Follow up syncope workup, orthostatic vitals, carotid ultrasound, echocardiogram.  EKG  Continue to monitor on telemetry.  Polysubstance use could have played a role.  Monitor electrolytes.  Keep K above 4, Mag about 2.  Pending above workup, consider Cardiology eval accordingly  Has back  pain, initiated ceftriaxone for antibiotics-6/2, follow up urine culture.Monitor fever curve and WBC.  Continue supportive care and appropriate home medications pending med rec.  Discussed with the staff      DVT prophylaxis-SCDs      Anticipated discharge and disposition-tbd, pending syncope workup and finalization of urine cultures          All diagnosis and differential diagnosis have been reviewed; assessment and plan has been documented; I have personally reviewed the labs and test results that are presently available; I have reviewed the patients medication list; I have reviewed the consulting providers response and recommendations. I have reviewed or attempted to review medical records based upon their availability.    All of the patient and family questions have been addressed and answered. Patient's is agreeable to the above stated plan. I will continue to monitor closely and make adjustments to medical management as needed.      06/02/2024

## 2024-06-02 NOTE — ED PROVIDER NOTES
Encounter Date: 6/1/2024    SCRIBE #1 NOTE: I, Nohemi Sands, am scribing for, and in the presence of,  Daljit Au MD. I have scribed the following portions of the note - Other sections scribed: HPI, ROS, PE.       History   No chief complaint on file.    54 year old female with a history of PE and hernia surgery presents to ED from [facility] via EMS as a level 2 trauma activation for a fall on Plavix PTA. Per EMS, pt reports she was standing trying to get to her night medications when she spontaneously lost consciousness and fell face forward at ground level. She was found on the the floor unresponsive for ~2 minutes. Pt does not remember mechanism of fall. She reports associated neck pain, shoulder pain, headache, dizziness, and back pain. C-collar applied, 4 Zofran given by EMS.     The history is provided by the patient and the EMS personnel. No  was used.     Review of patient's allergies indicates:   Allergen Reactions    Ketorolac     Morpholine analogues     Shellfish containing products     Sulfa (sulfonamide antibiotics)     Tramadol      No past medical history on file.  No past surgical history on file.  No family history on file.     Review of Systems   Respiratory:  Negative for shortness of breath.    Cardiovascular:  Negative for chest pain.   Gastrointestinal:  Negative for abdominal pain.   Musculoskeletal:  Positive for back pain, myalgias (shoulder) and neck pain.   Neurological:  Positive for dizziness, syncope, weakness, light-headedness and headaches.       Physical Exam     Initial Vitals [06/01/24 2145]   BP Pulse Resp Temp SpO2   132/86 100 (!) 27 98.5 °F (36.9 °C) 99 %      MAP       --         Physical Exam    Nursing note and vitals reviewed.  Constitutional: She appears well-developed and well-nourished. Airway: Normal. She is not diaphoretic. No distress. Cervical collar in place.   Airways intact   HENT:   Head: Normocephalic. Head is with  abrasion.   Right Ear: External ear normal.   Left Ear: External ear normal.   Nose: Nose normal.   Hematoma abrasion to right forehead   Eyes: Conjunctivae and EOM are normal. Pupils are equal, round, and reactive to light. Right eye exhibits no discharge. Left eye exhibits no discharge.   Pupils 2-3 mm, reactive bilaterally   Neck:   C-collar placed   Cardiovascular:  Normal rate, regular rhythm, normal heart sounds and intact distal pulses.     Exam reveals no gallop and no friction rub.       No murmur heard.  Pulses:       Radial pulses are 2+ on the right side and 2+ on the left side.        Dorsalis pedis pulses are 2+ on the right side and 2+ on the left side.   Pulmonary/Chest: Breath sounds normal. No respiratory distress. She has no wheezes. She has no rhonchi. She has no rales. She exhibits no tenderness.   Equal breath sounds   Abdominal: Abdomen is soft. Bowel sounds are normal. She exhibits no distension and no mass. There is no abdominal tenderness. The pelvis is stable. There is no rebound and no guarding.   Musculoskeletal:         General: Tenderness present. No edema. Normal range of motion.      Comments: Cervical and thoracic tenderness, no step offs or deformities  No lumbar spine tenderness, step offs, or deformities     Neurological: She is alert and oriented to person, place, and time. No cranial nerve deficit or sensory deficit. GCS score is 15. GCS eye subscore is 4. GCS verbal subscore is 5. GCS motor subscore is 6.   Equal strength and sensations to bilateral upper and lower extremities   Skin: Skin is warm and dry. Capillary refill takes less than 2 seconds. No erythema. No pallor.         ED Course   Procedures  Labs Reviewed   COMPREHENSIVE METABOLIC PANEL - Abnormal; Notable for the following components:       Result Value    Chloride 111 (*)     Glucose 124 (*)     All other components within normal limits   PROTIME-INR - Abnormal; Notable for the following components:    PT 15.2  (*)     All other components within normal limits   APTT - Normal   LACTIC ACID, PLASMA - Normal   ALCOHOL,MEDICAL (ETHANOL) - Normal   TROPONIN I - Normal   CBC W/ AUTO DIFFERENTIAL    Narrative:     The following orders were created for panel order CBC auto differential.  Procedure                               Abnormality         Status                     ---------                               -----------         ------                     CBC with Differential[[id number]]                           Final result                 Please view results for these tests on the individual orders.   CBC WITH DIFFERENTIAL   URINALYSIS, REFLEX TO URINE CULTURE   DRUG SCREEN, URINE (BEAKER)   TYPE & SCREEN   ABORH RETYPE          Imaging Results              CT Chest Abdomen Pelvis With IV Contrast (XPD) NO Oral Contrast (Preliminary result)  Result time 06/01/24 22:40:09      Preliminary result by Nicholas Villalpando MD (06/01/24 22:40:09)                   Narrative:    START OF REPORT:  Technique: CT Scan of the chest abdomen and pelvis was performed with intravenous contrast with axial as well as sagittal and, coronal images.    Dosage Information: Automated Exposure Control was utilized.    Comparison: None.    Clinical History: Lv 2 trauma- GL fall, + thinners, head, neck pain.    Findings:  Soft Tissues: Unremarkable.  Lines and Tubes: None.  Neck: The visualized soft tissues of the neck appear unremarkable. The left thyroid gland is not identified. Correlate with clinical findings and prior surgical intervention. There is a small hypodense nodule in the right thyroid lobe (series 2 image 7). Correlate with clinical and laboratory findings as regards additional evaluation such as ultrasound.  Mediastinum: The mediastinal structures are within normal limits.  Heart: The heart size is within normal limits.  Aorta: Unremarkable appearing aorta. No aortic dissection or aneurysm is seen.  Lungs: Subtle streaky linear opacity  is seen predominantly in the dependent portions at the lung bases consistent with scarring and subsegmental atelectasis. Otherwise clear lungs with no areas of focal infiltrate or consolidation.  Pleura: No effusions or pneumothorax are identified.  Bony Structures:  Spine: Spondylolytic changes are seen in the thoracic spine.  Ribs: No rib fractures are identified.  Abdomen:  Abdominal Wall: No abdominal wall pathology is seen.  Liver: The liver appears unremarkable.  Biliary System: The extra hepatic biliary system appears prominent which may reflect prior obstructive physiology in this patient status post cholecystectomy.  Gallbladder: Surgical clips are seen in the gallbladder fossa which may reflect prior cholecystectomy.  Pancreas: The pancreas appears unremarkable.  Spleen: The spleen appears unremarkable.  Adrenals: The adrenal glands appear unremarkable.  Kidneys: The kidneys appear unremarkable with no stones cysts masses or hydronephrosis with IV contrast decreasing sensitivity and specificity for stones.  Aorta: The abdominal aorta appears unremarkable.  IVC: Unremarkable.  Bowel:  Esophagus: The visualized esophagus appears unremarkable.  Stomach: The stomach appears unremarkable.  Duodenum: Unremarkable appearing duodenum.  Small Bowel: The small bowel appears unremarkable.  Colon: Nondistended.  Appendix: The appendix is not identified but no inflammatory changes are seen in the right lower quadrant to suggest appendicitis.  Peritoneum: No intraperitoneal free air or ascites is seen.    Pelvis:  Bladder: The bladder is nondistended but appears otherwise unremarkable.  Female:  Uterus: The uterus appears unremarkable for age.  Ovaries: No adnexal masses are seen.    Bony structures:  Dorsal Spine: There is spondylosis of the visualized dorsal spine.  Bony Pelvis: The visualized bony structures of the pelvis appear unremarkable.    Miscellaneous: Note is made of an orthopedic hardwarde in the lower  cervical region.      Impression:  1. No acute traumatic intrathoracic pathology identified. No acute traumatic intraabdominal or pelvic solid organ or bowel pathology identified. Details and other findings as discussed above.                                         CT Cervical Spine Without Contrast (Preliminary result)  Result time 06/01/24 22:38:04      Preliminary result by Nicholas Villalpando MD (06/01/24 22:38:04)                   Narrative:    START OF REPORT:  Technique: CT of the cervical spine was performed without intravenous contrast with axial as well as sagittal and coronal images.    Comparison: None.    Dosage Information: Automated exposure control was utilized.    Clinical history: Lv 2 trauma- GL fall, + thinners, head, neck pain.    Findings:  Lung apices: Chest CT findings discussed separately.  Spine:  Spinal canal: The spinal canal appears unremarkable.  Mineralization: Within normal limits for age.  Rotation: No significant rotation is seen.  Scoliosis: No significant scoliosis is seen.  Listhesis: No significant listhesis is identified.  Lordosis: The cervical lordosis is maintained.  Intervertebral disc spaces: The rest of the intervertebral discs are preserved.  Osteophytes: Moderate multilevel endplate osteophytes are seen.  Endplate Sclerosis: No significant endplate sclerosis is seen.  Uncovertebral degenerative changes: Mild multilevel uncovertebral joint arthrosis is seen.  Facet degenerative changes: Mild multilevel facet degenerative changes are seen.  Calcifications: None.  Fractures: No acute cervical spine fracture dislocation or subluxation is seen.  Orthopedic Hardware: There is anterior orthopedic cervical fusion from C3 to C7.    Miscellaneous:  Mastoid air cells: The visualized mastoid air cells appear clear.  Soft Tissues: Unremarkable.      Impression:  1. No acute cervical spine fracture dislocation or subluxation is seen.  2. Degenerative and postsurgical changes and other  details as above. Details and other findings as noted above.                                         CT Head Without Contrast (Preliminary result)  Result time 06/01/24 22:17:36      Preliminary result by Nicholas Villalpando MD (06/01/24 22:17:36)                   Narrative:    START OF REPORT:  Technique: CT of the head was performed without intravenous contrast with direct axial as well as coronal and sagittal reconstruction images.    Comparison: None.    Clinical history: Lv 2 trauma- GL fall, + thinners, head, neck pain.    FINDINGS:  Hemorrhage: No acute intracranial hemorrhage is seen.  CSF spaces: The ventricles, sulci and basal cisterns all appear mildly prominent global cerebral atrophy.  Brain parenchyma: Unremarkable with preservation of the grey white junction throughout.  Cerebellum: Unremarkable.  Vascular: Unremarkable venous sinuses. Subtle scattered atheromatous calcification of the intracranial arteries is seen.  Sella and skull base: Unremarkable.  Cerebellopontine angles: Normal.  Herniation: None.  Intracranial calcifications: Incidental note is made of bilateral choroid plexus calcification. Incidental note is made of some pineal region calcification.  Calvarium: No acute linear or depressed skull fracture is seen.  Scalp: There is mild soft tissue swelling with hyperdense component in the right frontal scalp and supraorbital region denoting contusion hematoma.    Maxillofacial Structures:  Paranasal sinuses: The visualized paranasal sinuses appear clear with no mucoperiosteal thickening or air fluid levels identified.  Orbits: Unremarkable.  Zygomatic arches: Unremarkable.  Temporal bones and mastoids: Unremarkable.  TMJ: The mandibular condyles appear normally placed with respect to the mandibular fossa.      Impression:  1. There is mild soft tissue swelling with hyperdense component in the right frontal scalp and supraorbital region denoting contusion hematoma.  2. No acute intracranial  traumatic injury identified. Details and other findings as noted above.                                         X-Ray Chest 1 View (Final result)  Result time 06/01/24 22:47:28      Final result by Jose Ontiveros MD (06/01/24 22:47:28)                   Impression:      No acute disease is seen      Electronically signed by: Jose Ontiveros MD  Date:    06/01/2024  Time:    22:47               Narrative:    EXAMINATION:  XR CHEST 1 VIEW    CLINICAL HISTORY:  r/o bleeding or hemorrhage;    TECHNIQUE:  Single frontal view of the chest was performed.    COMPARISON:  None    FINDINGS:  Lungs are clear.  Heart size is within normal limits.  Costophrenic angles are clear.                                       X-Ray Pelvis Routine AP (Final result)  Result time 06/01/24 22:54:51      Final result by Jose Ontiveros MD (06/01/24 22:54:51)                   Impression:      Several metallic densities overlying the pelvis.  No fractures are seen      Electronically signed by: Jose Ontiveros MD  Date:    06/01/2024  Time:    22:54               Narrative:    EXAMINATION:  XR PELVIS ROUTINE AP    CLINICAL HISTORY:  r/o bleeding or hemorrhage;    TECHNIQUE:  AP view of the pelvis was performed.    COMPARISON:  None.    FINDINGS:  There are no fractures seen.  There is no dislocation.  There is several metallic densities overlying the pelvis.  I do not know if these are in or on the patient.                                       Medications   0.9%  NaCl infusion (0 mL/hr Intravenous Stopped 6/1/24 2240)   iohexoL (OMNIPAQUE 350) injection 100 mL (100 mLs Intravenous Given 6/1/24 2210)   fentaNYL injection 100 mcg (100 mcg Intravenous Given 6/1/24 2235)   ondansetron injection 4 mg (4 mg Intravenous Given 6/1/24 2334)     Medical Decision Making  The differential diagnosis includes, but is not limited to abrasion, contusion, fracture, traumatic ICH, TBI, concussion, spinal injury, fracture, pneumothorax, hemothorax, intrathoracic  injury, intraabdominal injury, hemorrhage, laceration     Patient was awake and alert.  On re-evaluation she reports she feels much better.  She was no midline cervical spine tenderness on re-evaluation.  CT scan negative.  C-collar removed by myself.  Patient tolerated procedure well.  Full range of motion with no discomfort.  Her labs were largely unrevealing unfortunately she was describes what seems to be syncopal episode.  We will admit to hospitalist for further evaluation management.  She was comfortable with this plan.  Discussed with Renetta, will admit.  EKGs sinus rhythm.  Troponin normal.  Patient comfortable with the plan.  Care transferred.    Amount and/or Complexity of Data Reviewed  Independent Historian: EMS     Details: Per EMS, pt reports she was standing trying to get to her night medications when she spontaneously lost consciousness and fell face forward at ground level. She was found on the the floor unresponsive for ~2 minutes.  External Data Reviewed: notes.     Details: Under trauma name, unable to chart review.  Labs: ordered. Decision-making details documented in ED Course.  Radiology: ordered and independent interpretation performed. Decision-making details documented in ED Course.  ECG/medicine tests: ordered and independent interpretation performed. Decision-making details documented in ED Course.    Risk  OTC drugs.  Prescription drug management.  Decision regarding hospitalization.            Scribe Attestation:   Scribe #1: I performed the above scribed service and the documentation accurately describes the services I performed. I attest to the accuracy of the note.    Attending Attestation:           Physician Attestation for Scribe:  Physician Attestation Statement for Scribe #1: I, Daljit Au MD, reviewed documentation, as scribed by Nohemi Sands in my presence, and it is both accurate and complete.             ED Course as of 06/02/24 0101   Sat Jun 01, 2024   1590  Comprehensive metabolic panel(!)  No significant electrolyte abnormality or renal dysfunction. [MM]   2220 CBC auto differential  No leukocytosis no anemia [MM]   2220 Alcohol, Serum: <10.0 [MM]   2220 PTT: 27.2 [MM]   2220 INR: 1.2 [MM]   2220 Lactic Acid Level: 1.5 [MM]   2221 CT Head Without Contrast  Negative for acute. [MM]   Sun Jun 02, 2024   0007 Troponin I: <0.010 [MM]   0007 Alcohol, Serum: <10.0 [MM]   0007 WBC: 5.79 [MM]   0007 Hemoglobin: 14.2 [MM]   0007 Hematocrit: 41.7 [MM]      ED Course User Index  [MM] Daljit Au MD                           Clinical Impression:  Final diagnoses:  [R55] Syncope  [R55] Syncope and collapse (Primary)          ED Disposition Condition    Observation Stable                Daljit Au MD  06/02/24 0101

## 2024-06-03 LAB
ALBUMIN SERPL-MCNC: 3.4 G/DL (ref 3.5–5)
ALBUMIN/GLOB SERPL: 1.1 RATIO (ref 1.1–2)
ALP SERPL-CCNC: 79 UNIT/L (ref 40–150)
ALT SERPL-CCNC: 11 UNIT/L (ref 0–55)
ANION GAP SERPL CALC-SCNC: 9 MEQ/L
AST SERPL-CCNC: 19 UNIT/L (ref 5–34)
BASOPHILS # BLD AUTO: 0.02 X10(3)/MCL
BASOPHILS NFR BLD AUTO: 0.5 %
BILIRUB SERPL-MCNC: 0.5 MG/DL
BUN SERPL-MCNC: 17.3 MG/DL (ref 9.8–20.1)
CALCIUM SERPL-MCNC: 8.7 MG/DL (ref 8.4–10.2)
CHLORIDE SERPL-SCNC: 111 MMOL/L (ref 98–107)
CO2 SERPL-SCNC: 22 MMOL/L (ref 22–29)
CREAT SERPL-MCNC: 0.87 MG/DL (ref 0.55–1.02)
CREAT/UREA NIT SERPL: 20
EOSINOPHIL # BLD AUTO: 0.1 X10(3)/MCL (ref 0–0.9)
EOSINOPHIL NFR BLD AUTO: 2.3 %
ERYTHROCYTE [DISTWIDTH] IN BLOOD BY AUTOMATED COUNT: 13.8 % (ref 11.5–17)
GFR SERPLBLD CREATININE-BSD FMLA CKD-EPI: >60 ML/MIN/1.73/M2
GLOBULIN SER-MCNC: 3 GM/DL (ref 2.4–3.5)
GLUCOSE SERPL-MCNC: 83 MG/DL (ref 74–100)
HCT VFR BLD AUTO: 41.4 % (ref 37–47)
HGB BLD-MCNC: 13.5 G/DL (ref 12–16)
IMM GRANULOCYTES # BLD AUTO: 0.01 X10(3)/MCL (ref 0–0.04)
IMM GRANULOCYTES NFR BLD AUTO: 0.2 %
LEFT CCA DIST DIAS: 19 CM/S
LEFT CCA DIST SYS: 64 CM/S
LEFT CCA PROX DIAS: 16 CM/S
LEFT CCA PROX SYS: 80 CM/S
LEFT ECA DIAS: 11 CM/S
LEFT ECA SYS: 69 CM/S
LEFT ICA DIST DIAS: 43 CM/S
LEFT ICA DIST SYS: 76 CM/S
LEFT ICA MID DIAS: 25 CM/S
LEFT ICA MID SYS: 64 CM/S
LEFT ICA PROX DIAS: 23 CM/S
LEFT ICA PROX SYS: 56 CM/S
LEFT VERTEBRAL DIAS: 2 CM/S
LEFT VERTEBRAL SYS: 30 CM/S
LYMPHOCYTES # BLD AUTO: 1.79 X10(3)/MCL (ref 0.6–4.6)
LYMPHOCYTES NFR BLD AUTO: 41.1 %
MAGNESIUM SERPL-MCNC: 2.3 MG/DL (ref 1.6–2.6)
MCH RBC QN AUTO: 30.6 PG (ref 27–31)
MCHC RBC AUTO-ENTMCNC: 32.6 G/DL (ref 33–36)
MCV RBC AUTO: 93.9 FL (ref 80–94)
MONOCYTES # BLD AUTO: 0.33 X10(3)/MCL (ref 0.1–1.3)
MONOCYTES NFR BLD AUTO: 7.6 %
NEUTROPHILS # BLD AUTO: 2.11 X10(3)/MCL (ref 2.1–9.2)
NEUTROPHILS NFR BLD AUTO: 48.3 %
NRBC BLD AUTO-RTO: 0 %
OHS CV CAROTID RIGHT ICA EDV HIGHEST: 26
OHS CV CAROTID ULTRASOUND LEFT ICA/CCA RATIO: 1.19
OHS CV CAROTID ULTRASOUND RIGHT ICA/CCA RATIO: 0.99
OHS CV PV CAROTID LEFT HIGHEST CCA: 80
OHS CV PV CAROTID LEFT HIGHEST ICA: 76
OHS CV PV CAROTID RIGHT HIGHEST CCA: 81
OHS CV PV CAROTID RIGHT HIGHEST ICA: 75
OHS CV US CAROTID LEFT HIGHEST EDV: 43
PHOSPHATE SERPL-MCNC: 4.5 MG/DL (ref 2.3–4.7)
PLATELET # BLD AUTO: 219 X10(3)/MCL (ref 130–400)
PMV BLD AUTO: 9.4 FL (ref 7.4–10.4)
POTASSIUM SERPL-SCNC: 5.2 MMOL/L (ref 3.5–5.1)
PROT SERPL-MCNC: 6.4 GM/DL (ref 6.4–8.3)
RBC # BLD AUTO: 4.41 X10(6)/MCL (ref 4.2–5.4)
RIGHT CCA DIST DIAS: 21 CM/S
RIGHT CCA DIST SYS: 76 CM/S
RIGHT CCA PROX DIAS: 14 CM/S
RIGHT CCA PROX SYS: 81 CM/S
RIGHT ECA DIAS: 11 CM/S
RIGHT ECA SYS: 76 CM/S
RIGHT ICA DIST DIAS: 26 CM/S
RIGHT ICA DIST SYS: 71 CM/S
RIGHT ICA MID DIAS: 17 CM/S
RIGHT ICA MID SYS: 75 CM/S
RIGHT ICA PROX DIAS: 9 CM/S
RIGHT ICA PROX SYS: 49 CM/S
RIGHT VERTEBRAL DIAS: 12 CM/S
RIGHT VERTEBRAL SYS: 40 CM/S
SODIUM SERPL-SCNC: 142 MMOL/L (ref 136–145)
WBC # SPEC AUTO: 4.36 X10(3)/MCL (ref 4.5–11.5)

## 2024-06-03 PROCEDURE — 21400001 HC TELEMETRY ROOM

## 2024-06-03 PROCEDURE — 36415 COLL VENOUS BLD VENIPUNCTURE: CPT | Performed by: INTERNAL MEDICINE

## 2024-06-03 PROCEDURE — 63600175 PHARM REV CODE 636 W HCPCS: Performed by: INTERNAL MEDICINE

## 2024-06-03 PROCEDURE — 36415 COLL VENOUS BLD VENIPUNCTURE: CPT | Performed by: NURSE PRACTITIONER

## 2024-06-03 PROCEDURE — 83735 ASSAY OF MAGNESIUM: CPT | Performed by: INTERNAL MEDICINE

## 2024-06-03 PROCEDURE — 25000003 PHARM REV CODE 250: Performed by: NURSE PRACTITIONER

## 2024-06-03 PROCEDURE — 84100 ASSAY OF PHOSPHORUS: CPT | Performed by: INTERNAL MEDICINE

## 2024-06-03 PROCEDURE — 97161 PT EVAL LOW COMPLEX 20 MIN: CPT

## 2024-06-03 PROCEDURE — 85025 COMPLETE CBC W/AUTO DIFF WBC: CPT | Performed by: INTERNAL MEDICINE

## 2024-06-03 PROCEDURE — 63600175 PHARM REV CODE 636 W HCPCS: Performed by: NURSE PRACTITIONER

## 2024-06-03 PROCEDURE — 80053 COMPREHEN METABOLIC PANEL: CPT | Performed by: NURSE PRACTITIONER

## 2024-06-03 PROCEDURE — 25000003 PHARM REV CODE 250: Performed by: INTERNAL MEDICINE

## 2024-06-03 RX ORDER — CLONIDINE HYDROCHLORIDE 0.1 MG/1
0.1 TABLET ORAL EVERY 6 HOURS PRN
Status: DISCONTINUED | OUTPATIENT
Start: 2024-06-03 | End: 2024-06-04 | Stop reason: HOSPADM

## 2024-06-03 RX ORDER — ALPRAZOLAM 0.5 MG/1
0.5 TABLET ORAL 2 TIMES DAILY PRN
Status: DISCONTINUED | OUTPATIENT
Start: 2024-06-03 | End: 2024-06-04 | Stop reason: HOSPADM

## 2024-06-03 RX ADMIN — ALPRAZOLAM 0.5 MG: 0.5 TABLET ORAL at 08:06

## 2024-06-03 RX ADMIN — OLANZAPINE 10 MG: 5 TABLET, ORALLY DISINTEGRATING ORAL at 04:06

## 2024-06-03 RX ADMIN — HYDROCODONE BITARTRATE AND ACETAMINOPHEN 1 TABLET: 5; 325 TABLET ORAL at 08:06

## 2024-06-03 RX ADMIN — ACETAMINOPHEN 1000 MG: 500 TABLET ORAL at 09:06

## 2024-06-03 RX ADMIN — HYDROCODONE BITARTRATE AND ACETAMINOPHEN 1 TABLET: 5; 325 TABLET ORAL at 12:06

## 2024-06-03 RX ADMIN — CEFTRIAXONE SODIUM 1 G: 1 INJECTION, POWDER, FOR SOLUTION INTRAMUSCULAR; INTRAVENOUS at 12:06

## 2024-06-03 RX ADMIN — THERA TABS 1 TABLET: TAB at 09:06

## 2024-06-03 RX ADMIN — ONDANSETRON 4 MG: 2 INJECTION INTRAMUSCULAR; INTRAVENOUS at 09:06

## 2024-06-03 RX ADMIN — DIVALPROEX SODIUM 250 MG: 250 TABLET, DELAYED RELEASE ORAL at 09:06

## 2024-06-03 RX ADMIN — CLONIDINE HYDROCHLORIDE 0.1 MG: 0.1 TABLET ORAL at 09:06

## 2024-06-03 RX ADMIN — DOXEPIN HYDROCHLORIDE 25 MG: 25 CAPSULE ORAL at 08:06

## 2024-06-03 RX ADMIN — SODIUM POLYSTYRENE SULFONATE 15 G: 15 SUSPENSION ORAL; RECTAL at 12:06

## 2024-06-03 RX ADMIN — ONDANSETRON 4 MG: 2 INJECTION INTRAMUSCULAR; INTRAVENOUS at 08:06

## 2024-06-03 RX ADMIN — HYDROXYZINE PAMOATE 50 MG: 25 CAPSULE ORAL at 02:06

## 2024-06-03 RX ADMIN — DIVALPROEX SODIUM 250 MG: 250 TABLET, DELAYED RELEASE ORAL at 08:06

## 2024-06-03 RX ADMIN — THIAMINE HCL TAB 100 MG 100 MG: 100 TAB at 09:06

## 2024-06-03 NOTE — CONSULTS
Inpatient consult to Cardiology  Consult performed by: Xavi Lynn FNP  Consult ordered by: Susan Baltazar MD  Reason for consult: Syncope        [facility] - Observation Unit    Cardiology  Consult Note    Patient Name: Claudia Villasenor  MRN: [id number]  Admission Date: 6/1/2024  Hospital Length of Stay: 0 days  Code Status: Full Code   Attending Provider: Susan Baltazar MD   Consulting Provider: LENA Ruiz  Primary Care Physician: No, Primary Doctor  Principal Problem:<principal problem not specified>    Patient information was obtained from patient, past medical records, ER records, and primary team.     Subjective:   Chief Complaint/Reason for Consult: Syncope    HPI:   Ms. Villasenor is a 54 year old female, unknown to [facility], who presented to the hospital on 6.1.24 via EMS Team from [facility] as a level 2 trauma status post fall which occurred prior to arrival. Patient was reportedly standing trying to get her PM medications when she spontaneously lost consciousness and fell face forward. She was reportedly unconscious for 2 Minutes. She endorsed neck pain, shoulder pain, headache, and back pain after the fall.   On arrival, hemodynamics noted to be stable. CT head without contrast demonstrated mild soft tissue swelling with hyperdense component in the right frontal scalp and supraorbital region done noting contusion hematoma, no acute intracranial traumatic injury identified. CT C-spine without contrast unremarkable. CXR unremarkable. Pelvic x-ray demonstrated similar overall metallic densities overlying the pelvis, no fracture seen. CT chest abdomen and pelvis with IV contrast unremarkable. Troponin noted to be normal. Lactate Normal. Drug Tox Positive for Amphetamines and Fentanyl.     PMH: Polysubstance Abuse, Chronic Neck Pain, Restless Leg Syndrome, Thyroid Cancer Status Post Partial Thyroidectomy, Migraine, History of Pulmonary Embolism  PSH: Left Knee Replacement,  Cervical Spine Surgery (Multiple), Partial Thyroidectomy, Cholecystectomy, Appendectomy, Bilateral Inguinal Hernia Repair  Family History: Mother- Bipolar Disorder/Depression, Dad- Polysubstance Abuse/Alcoholism/PTSD/Depression  Social History: Tobacco- Negative, Alcohol- Negative, Substance Abuse- Heroin/Methamphetamine Use    Previous Cardiac Diagnostics:   Echocardiogram (6.2.24):  Left Ventricle: The left ventricle is normal in size. Normal wall thickness. There is mildly reduced systolic function with a visually estimated ejection fraction of 45 - 50%. Grade I diastolic dysfunction.  Right Ventricle: Normal right ventricular cavity size. Systolic function is normal. TAPSE is 2.06 cm.  Aortic Valve: The aortic valve is structurally normal.  Mitral Valve: The mitral valve is structurally normal. There is trace regurgitation.  Tricuspid Valve: The tricuspid valve is structurally normal.  Pulmonic Valve: There is trace regurgitation.  IVC/SVC: Normal venous pressure at 3 mmHg.  Pericardium: There is no pericardial effusion.    Carotid US (6.2.24):  The right internal carotid artery demonstrated no hemodynamically significant stenosis.  The left internal carotid artery demonstrated no hemodynamically significant stenosis.    Bilateral vertebral arteries were patent with antegrade flow.      Review of patient's allergies indicates:   Allergen Reactions    Ketorolac     Morpholine analogues     Shellfish containing products     Sulfa (sulfonamide antibiotics)     Tramadol      No current facility-administered medications on file prior to encounter.     Current Outpatient Medications on File Prior to Encounter   Medication Sig    cloNIDine (CATAPRES) 0.1 MG tablet Take 0.05 mg by mouth 2 (two) times daily.    clopidogreL (PLAVIX) 75 mg tablet Take 75 mg by mouth once daily.    divalproex (DEPAKOTE) 250 MG EC tablet Take 250 mg by mouth 2 (two) times a day.    doxepin (SINEQUAN) 25 MG capsule Take 25 mg by mouth every  evening.    lumateperone (CAPLYTA) 21 mg Cap Take 21 mg by mouth.    multivitamin (THERAGRAN) per tablet Take 1 tablet by mouth once daily.    thiamine mononitrate, vit B1, (VITAMIN B-1, MONONITRATE,) 100 mg Tab Take 100 mg by mouth once daily.     Review of Systems   Respiratory:  Negative for chest tightness and shortness of breath.    Cardiovascular:  Negative for chest pain.   Neurological:  Positive for syncope and light-headedness.   All other systems reviewed and are negative.    Objective:     Vital Signs (Most Recent):  Temp: 98.6 °F (37 °C) (06/03/24 1128)  Pulse: 82 (06/03/24 1128)  Resp: 18 (06/03/24 1224)  BP: 120/74 (06/03/24 1128)  SpO2: 95 % (06/03/24 1128) Vital Signs (24h Range):  Temp:  [97.8 °F (36.6 °C)-98.8 °F (37.1 °C)] 98.6 °F (37 °C)  Pulse:  [74-91] 82  Resp:  [16-20] 18  SpO2:  [95 %-100 %] 95 %  BP: (103-149)/(64-85) 120/74   Weight: 73.9 kg (163 lb)  Body mass index is 30.8 kg/m².  SpO2: 95 %       Intake/Output Summary (Last 24 hours) at 6/3/2024 1233  Last data filed at 6/3/2024 0631  Gross per 24 hour   Intake 120 ml   Output --   Net 120 ml     Lines/Drains/Airways       Peripheral Intravenous Line  Duration                  Peripheral IV - Single Lumen 06/01/24 2142 18 G Left;Posterior Forearm 1 day                  Significant Labs:  Recent Results (from the past 72 hour(s))   Comprehensive metabolic panel    Collection Time: 06/01/24  9:51 PM   Result Value Ref Range    Sodium 142 136 - 145 mmol/L    Potassium 4.0 3.5 - 5.1 mmol/L    Chloride 111 (H) 98 - 107 mmol/L    CO2 22 22 - 29 mmol/L    Glucose 124 (H) 74 - 100 mg/dL    Blood Urea Nitrogen 15.4 9.8 - 20.1 mg/dL    Creatinine 0.92 0.55 - 1.02 mg/dL    Calcium 9.0 8.4 - 10.2 mg/dL    Protein Total 6.5 6.4 - 8.3 gm/dL    Albumin 3.5 3.5 - 5.0 g/dL    Globulin 3.0 2.4 - 3.5 gm/dL    Albumin/Globulin Ratio 1.2 1.1 - 2.0 ratio    Bilirubin Total 0.5 <=1.5 mg/dL    ALP 86 40 - 150 unit/L    ALT 11 0 - 55 unit/L    AST 14 5 - 34  unit/L    eGFR >60 mL/min/1.73/m2    Anion Gap 9.0 mEq/L    BUN/Creatinine Ratio 17    Protime-INR    Collection Time: 06/01/24  9:51 PM   Result Value Ref Range    PT 15.2 (H) 12.5 - 14.5 seconds    INR 1.2 <=1.3   APTT    Collection Time: 06/01/24  9:51 PM   Result Value Ref Range    PTT 27.2 23.2 - 33.7 seconds   Lactic Acid, Plasma    Collection Time: 06/01/24  9:51 PM   Result Value Ref Range    Lactic Acid Level 1.5 0.5 - 2.2 mmol/L   Type & Screen    Collection Time: 06/01/24  9:51 PM   Result Value Ref Range    Group & Rh O POS     Indirect Brent GEL NEG     Specimen Outdate 06/04/2024 23:59    Ethanol    Collection Time: 06/01/24  9:51 PM   Result Value Ref Range    Ethanol Level <10.0 <=10.0 mg/dL   CBC with Differential    Collection Time: 06/01/24  9:51 PM   Result Value Ref Range    WBC 5.79 4.50 - 11.50 x10(3)/mcL    RBC 4.60 4.20 - 5.40 x10(6)/mcL    Hgb 14.2 12.0 - 16.0 g/dL    Hct 41.7 37.0 - 47.0 %    MCV 90.7 80.0 - 94.0 fL    MCH 30.9 27.0 - 31.0 pg    MCHC 34.1 33.0 - 36.0 g/dL    RDW 13.6 11.5 - 17.0 %    Platelet 224 130 - 400 x10(3)/mcL    MPV 9.0 7.4 - 10.4 fL    Neut % 71.6 %    Lymph % 18.7 %    Mono % 7.6 %    Eos % 1.6 %    Basophil % 0.3 %    Lymph # 1.08 0.6 - 4.6 x10(3)/mcL    Neut # 4.15 2.1 - 9.2 x10(3)/mcL    Mono # 0.44 0.1 - 1.3 x10(3)/mcL    Eos # 0.09 0 - 0.9 x10(3)/mcL    Baso # 0.02 <=0.2 x10(3)/mcL    IG# 0.01 0 - 0.04 x10(3)/mcL    IG% 0.2 %    NRBC% 0.0 %   Troponin I    Collection Time: 06/01/24  9:51 PM   Result Value Ref Range    Troponin-I <0.010 0.000 - 0.045 ng/mL   Magnesium    Collection Time: 06/01/24  9:51 PM   Result Value Ref Range    Magnesium Level 2.20 1.60 - 2.60 mg/dL   ABORH RETYPE    Collection Time: 06/01/24  9:52 PM   Result Value Ref Range    ABORH Retype O POS    Urinalysis, Reflex to Urine Culture    Collection Time: 06/02/24  9:00 AM    Specimen: Urine   Result Value Ref Range    Color, UA Light-Yellow Yellow, Light-Yellow, Colorless, Straw,  Dark-Yellow    Appearance, UA Clear Clear    Specific Gravity, UA >1.050 (H) 1.005 - 1.030    pH, UA 7.0 5.0 - 8.5    Protein, UA Negative Negative    Glucose, UA Normal Negative, Normal    Ketones, UA Trace (A) Negative    Blood, UA Negative Negative    Bilirubin, UA Negative Negative    Urobilinogen, UA Normal 0.2, 1.0, Normal    Nitrites, UA Negative Negative    Leukocyte Esterase,  (A) Negative    WBC, UA 21-50 (A) None Seen, 0-2, 3-5, 0-5 /HPF    Bacteria, UA Moderate (A) None Seen, Trace /HPF    Squamous Epithelial Cells, UA Few (A) None Seen /HPF    Mucous, UA Trace (A) None Seen /LPF    RBC, UA 0-5 None Seen, 0-2, 3-5, 0-5 /HPF   Drug Screen, Urine    Collection Time: 06/02/24  9:00 AM   Result Value Ref Range    Amphetamines, Urine Positive (A) Negative    Barbiturates, Urine Negative Negative    Benzodiazepine, Urine Negative Negative    Cannabinoids, Urine Negative Negative    Cocaine, Urine Negative Negative    Fentanyl, Urine Positive (A) Negative    MDMA, Urine Negative Negative    Opiates, Urine Negative Negative    Phencyclidine, Urine Negative Negative    pH, Urine 7.0 3.0 - 11.0    Specific Gravity, Urine Auto >1.050 (H) 1.001 - 1.035   Urine culture    Collection Time: 06/02/24  9:00 AM    Specimen: Urine   Result Value Ref Range    Urine Culture No Significant Growth    Echo    Collection Time: 06/02/24  9:30 AM   Result Value Ref Range    BSA 1.78 m2    Man's Biplane MOD Ejection Fraction 50 %    LVOT stroke volume 67.25 cm3    LVIDd 4.89 3.5 - 6.0 cm    LV Systolic Volume 62.70 mL    LV Systolic Volume Index 36.2 mL/m2    LVIDs 3.82 2.1 - 4.0 cm    LV Diastolic Volume 112.00 mL    LV Diastolic Volume Index 64.74 mL/m2    IVS 0.88 0.6 - 1.1 cm    LVOT diameter 2.20 cm    LVOT area 3.8 cm2    FS 22 (A) 28 - 44 %    Left Ventricle Relative Wall Thickness 0.34 cm    Posterior Wall 0.82 0.6 - 1.1 cm    LV mass 141.43 g    LV Mass Index 82 g/m2    MV Peak E Mike 0.76 m/s    TDI LATERAL 0.13  m/s    TDI SEPTAL 0.08 m/s    E/E' ratio 7.24 m/s    MV Peak A Mike 0.81 m/s    E/A ratio 0.94     E wave deceleration time 180.00 msec    LV SEPTAL E/E' RATIO 9.50 m/s    LV LATERAL E/E' RATIO 5.85 m/s    LVOT peak mike 0.84 m/s    Left Ventricular Outflow Tract Mean Velocity 0.55 cm/s    Left Ventricular Outflow Tract Mean Gradient 1.00 mmHg    TAPSE 2.06 cm    LA size 3.80 cm    AV mean gradient 4 mmHg    AV peak gradient 7 mmHg    Ao peak mike 1.30 m/s    Ao VTI 27.80 cm    LVOT peak VTI 17.70 cm    AV valve area 2.42 cm²    AV Velocity Ratio 0.65     AV index (prosthetic) 0.64     MALISSA by Velocity Ratio 2.45 cm²    MV mean gradient 2 mmHg    MV peak gradient 4 mmHg    MV stenosis pressure 1/2 time 83.00 ms    MV valve area p 1/2 method 2.65 cm2    MV valve area by continuity eq 2.67 cm2    MV VTI 25.2 cm    Mean e' 0.11 m/s    ZLVIDS 2.00     ZLVIDD 0.20     Sinus 3.0 cm    Mitral Valve Heart Rate 77 bpm    Est. RA pres 3 mmHg   CV Ultrasound Bilateral Doppler Carotid    Collection Time: 06/02/24 10:35 AM   Result Value Ref Range    Right CCA prox sys 81 cm/s    Right CCA prox emerson 14 cm/s    Right CCA dist sys 76 cm/s    Right CCA dist emerson 21 cm/s    Right ICA prox sys 49 cm/s    Right ICA prox emerson 9 cm/s    Right ICA mid sys 75 cm/s    Right ICA mid emerson 17 cm/s    Right ICA dist sys 71 cm/s    Right ICA dist emerson 26 cm/s    Right ECA sys 76 cm/s    Right ECA emerson 11 cm/s    Right vertebral sys 40 cm/s    Right vertebral emerson 12 cm/s    Right ICA/CCA ratio 0.99     Right Highest ICA 75.00     Right Highest EDV 26.00     Right Highest CCA 81     Left CCA prox sys 80 cm/s    Left CCA prox emerson 16 cm/s    Left CCA dist sys 64 cm/s    Left CCA dist emerson 19 cm/s    Left ICA prox sys 56 cm/s    Left ICA prox emerson 23 cm/s    Left ICA mid sys 64 cm/s    Left ICA mid emerson 25 cm/s    Left ICA dist sys 76 cm/s    Left ICA dist emerson 43 cm/s    Left ECA sys 69 cm/s    Left ECA emerson 11 cm/s    Left vertebral sys 30 cm/s     Left vertebral emerson 2 cm/s    Left ICA/CCA ratio 1.19     Left Highest ICA 76.00     LT Highest EDV 43.00     Left Highest CCA 80    Comprehensive Metabolic Panel    Collection Time: 06/03/24  4:28 AM   Result Value Ref Range    Sodium 142 136 - 145 mmol/L    Potassium 5.2 (H) 3.5 - 5.1 mmol/L    Chloride 111 (H) 98 - 107 mmol/L    CO2 22 22 - 29 mmol/L    Glucose 83 74 - 100 mg/dL    Blood Urea Nitrogen 17.3 9.8 - 20.1 mg/dL    Creatinine 0.87 0.55 - 1.02 mg/dL    Calcium 8.7 8.4 - 10.2 mg/dL    Protein Total 6.4 6.4 - 8.3 gm/dL    Albumin 3.4 (L) 3.5 - 5.0 g/dL    Globulin 3.0 2.4 - 3.5 gm/dL    Albumin/Globulin Ratio 1.1 1.1 - 2.0 ratio    Bilirubin Total 0.5 <=1.5 mg/dL    ALP 79 40 - 150 unit/L    ALT 11 0 - 55 unit/L    AST 19 5 - 34 unit/L    eGFR >60 mL/min/1.73/m2    Anion Gap 9.0 mEq/L    BUN/Creatinine Ratio 20    Magnesium    Collection Time: 06/03/24  4:28 AM   Result Value Ref Range    Magnesium Level 2.30 1.60 - 2.60 mg/dL   Phosphorus    Collection Time: 06/03/24  4:28 AM   Result Value Ref Range    Phosphorus Level 4.5 2.3 - 4.7 mg/dL   CBC with Differential    Collection Time: 06/03/24  8:03 AM   Result Value Ref Range    WBC 4.36 (L) 4.50 - 11.50 x10(3)/mcL    RBC 4.41 4.20 - 5.40 x10(6)/mcL    Hgb 13.5 12.0 - 16.0 g/dL    Hct 41.4 37.0 - 47.0 %    MCV 93.9 80.0 - 94.0 fL    MCH 30.6 27.0 - 31.0 pg    MCHC 32.6 (L) 33.0 - 36.0 g/dL    RDW 13.8 11.5 - 17.0 %    Platelet 219 130 - 400 x10(3)/mcL    MPV 9.4 7.4 - 10.4 fL    Neut % 48.3 %    Lymph % 41.1 %    Mono % 7.6 %    Eos % 2.3 %    Basophil % 0.5 %    Lymph # 1.79 0.6 - 4.6 x10(3)/mcL    Neut # 2.11 2.1 - 9.2 x10(3)/mcL    Mono # 0.33 0.1 - 1.3 x10(3)/mcL    Eos # 0.10 0 - 0.9 x10(3)/mcL    Baso # 0.02 <=0.2 x10(3)/mcL    IG# 0.01 0 - 0.04 x10(3)/mcL    IG% 0.2 %    NRBC% 0.0 %     Significant Imaging:  Imaging Results              CT Chest Abdomen Pelvis With IV Contrast (XPD) NO Oral Contrast (Final result)  Result time 06/02/24 07:57:19       Final result by Esau Veliz MD (06/02/24 07:57:19)                   Impression:      No posttraumatic abnormality of the chest abdomen and pelvis.    [facility] concordance      Electronically signed by: Esau Veliz MD  Date:    06/02/2024  Time:    07:57               Narrative:    EXAMINATION:  CT CHEST ABDOMEN PELVIS WITH IV CONTRAST (XPD)    CLINICAL HISTORY:  Trauma;    TECHNIQUE:  Axial images of the chest abdomen and pelvis were obtained with IV contrast administration.  Coronal and sagittal reconstructions were provided.  Three dimensional and MIP images were obtained and evaluated.  Total DLP was 390 mGy-cm. Dose lowering technique and automated exposure control were utilized for this exam.    COMPARISON:  None    FINDINGS:  There is no traumatic aortic injury.  There is no active extravasation.  There is no pneumothorax.  There is no free fluid in the pelvis.    The airway is widely patent.  There is no mediastinal or hilar lymphadenopathy.  There is no central pulmonary embolus.  The heart is normal in size.  The lungs are clear.  There is no pulmonary contusion or laceration.  There is no pleural effusion.  There is no hemothorax.  There is no pulmonary nodule or mass.  There is no ground-glass or reticulonodular airspace opacity.    The liver, spleen, pancreas, adrenal glands, and kidneys are normal.  There is no solid organ laceration.  The gallbladder is absent.  The stomach and small bowel are decompressed.  The appendix is normal.  The colon is normal.  The uterus and adnexa are normal for age.  The urinary bladder is decompressed.  There is no pelvic or retroperitoneal lymphadenopathy.  The aorta is nonaneurysmal.  There is no lytic or blastic osseous lesion.  There is no fracture.                        Preliminary result by Nicholas Villalpando MD (06/01/24 22:40:09)                   Impression:    1. No acute traumatic intrathoracic pathology identified. No acute traumatic intraabdominal or  pelvic solid organ or bowel pathology identified. Details and other findings as discussed above.               Narrative:    START OF REPORT:  Technique: CT Scan of the chest abdomen and pelvis was performed with intravenous contrast with axial as well as sagittal and, coronal images.    Dosage Information: Automated Exposure Control was utilized.    Comparison: None.    Clinical History: Lv 2 trauma- GL fall, + thinners, head, neck pain.    Findings:  Soft Tissues: Unremarkable.  Lines and Tubes: None.  Neck: The visualized soft tissues of the neck appear unremarkable. The left thyroid gland is not identified. Correlate with clinical findings and prior surgical intervention. There is a small hypodense nodule in the right thyroid lobe (series 2 image 7). Correlate with clinical and laboratory findings as regards additional evaluation such as ultrasound.  Mediastinum: The mediastinal structures are within normal limits.  Heart: The heart size is within normal limits.  Aorta: Unremarkable appearing aorta. No aortic dissection or aneurysm is seen.  Lungs: Subtle streaky linear opacity is seen predominantly in the dependent portions at the lung bases consistent with scarring and subsegmental atelectasis. Otherwise clear lungs with no areas of focal infiltrate or consolidation.  Pleura: No effusions or pneumothorax are identified.  Bony Structures:  Spine: Spondylolytic changes are seen in the thoracic spine.  Ribs: No rib fractures are identified.  Abdomen:  Abdominal Wall: No abdominal wall pathology is seen.  Liver: The liver appears unremarkable.  Biliary System: The extra hepatic biliary system appears prominent which may reflect prior obstructive physiology in this patient status post cholecystectomy.  Gallbladder: Surgical clips are seen in the gallbladder fossa which may reflect prior cholecystectomy.  Pancreas: The pancreas appears unremarkable.  Spleen: The spleen appears unremarkable.  Adrenals: The adrenal  glands appear unremarkable.  Kidneys: The kidneys appear unremarkable with no stones cysts masses or hydronephrosis with IV contrast decreasing sensitivity and specificity for stones.  Aorta: The abdominal aorta appears unremarkable.  IVC: Unremarkable.  Bowel:  Esophagus: The visualized esophagus appears unremarkable.  Stomach: The stomach appears unremarkable.  Duodenum: Unremarkable appearing duodenum.  Small Bowel: The small bowel appears unremarkable.  Colon: Nondistended.  Appendix: The appendix is not identified but no inflammatory changes are seen in the right lower quadrant to suggest appendicitis.  Peritoneum: No intraperitoneal free air or ascites is seen.    Pelvis:  Bladder: The bladder is nondistended but appears otherwise unremarkable.  Female:  Uterus: The uterus appears unremarkable for age.  Ovaries: No adnexal masses are seen.    Bony structures:  Dorsal Spine: There is spondylosis of the visualized dorsal spine.  Bony Pelvis: The visualized bony structures of the pelvis appear unremarkable.    Miscellaneous: Note is made of an orthopedic hardwarde in the lower cervical region.                                         CT Cervical Spine Without Contrast (Final result)  Result time 06/02/24 09:53:48      Final result by Esau Veliz MD (06/02/24 09:53:48)                   Impression:      Postoperative changes following anterior fusion from C3-C6 with auto fusion of C6 and C7 and grade 1 anterolisthesis of C7 on T1.  There is no posttraumatic abnormality of the cervical spine.    [facility] concordance      Electronically signed by: Esau Veliz MD  Date:    06/02/2024  Time:    09:53               Narrative:    EXAMINATION:  CT CERVICAL SPINE WITHOUT CONTRAST    CLINICAL HISTORY:  Trauma;    TECHNIQUE:  Axial images of the cervical spine were obtained without IV contrast administration.  Coronal and sagittal reconstructions were provided.  Three dimensional and MIP images were obtained and evaluated.   Total DLP was 1123 mGy-cm. Dose lowering technique and automated exposure control were utilized for this exam.    COMPARISON:  None    FINDINGS:  There are postoperative changes following anterior fusion from C3 to C6.  There is auto fusion of the C6 and C7 vertebral bodies.  There is 2 mm of anterolisthesis of C7 on T1.  There is multilevel degenerative facet arthropathy most prominent at C6-C7.  The included portions of the posterior fossa are normal.  The craniocervical junction is symmetric.  The atlantoaxial articulation is normal.  The posterior elements are well aligned and intact.  There is no fracture.    The airway is widely patent.  There is no cervical lymphadenopathy.  There are postoperative changes following left hemithyroidectomy.  The visualized lung apices are clear.                        Preliminary result by Nicholas Villalpando MD (06/01/24 22:38:04)                   Impression:    1. No acute cervical spine fracture dislocation or subluxation is seen.  2. Degenerative and postsurgical changes and other details as above. Details and other findings as noted above.               Narrative:    START OF REPORT:  Technique: CT of the cervical spine was performed without intravenous contrast with axial as well as sagittal and coronal images.    Comparison: None.    Dosage Information: Automated exposure control was utilized.    Clinical history: Lv 2 trauma- GL fall, + thinners, head, neck pain.    Findings:  Lung apices: Chest CT findings discussed separately.  Spine:  Spinal canal: The spinal canal appears unremarkable.  Mineralization: Within normal limits for age.  Rotation: No significant rotation is seen.  Scoliosis: No significant scoliosis is seen.  Listhesis: No significant listhesis is identified.  Lordosis: The cervical lordosis is maintained.  Intervertebral disc spaces: The rest of the intervertebral discs are preserved.  Osteophytes: Moderate multilevel endplate osteophytes are  seen.  Endplate Sclerosis: No significant endplate sclerosis is seen.  Uncovertebral degenerative changes: Mild multilevel uncovertebral joint arthrosis is seen.  Facet degenerative changes: Mild multilevel facet degenerative changes are seen.  Calcifications: None.  Fractures: No acute cervical spine fracture dislocation or subluxation is seen.  Orthopedic Hardware: There is anterior orthopedic cervical fusion from C3 to C7.    Miscellaneous:  Mastoid air cells: The visualized mastoid air cells appear clear.  Soft Tissues: Unremarkable.                                         CT Head Without Contrast (Final result)  Result time 06/02/24 09:16:32      Final result by Esau Veliz MD (06/02/24 09:16:32)                   Impression:      Right frontal scalp hematoma without underlying fracture or acute intracranial abnormality.    [facility] concordance      Electronically signed by: Esau Veliz MD  Date:    06/02/2024  Time:    09:16               Narrative:    EXAMINATION:  CT HEAD WITHOUT CONTRAST    CLINICAL HISTORY:  Trauma;    TECHNIQUE:  Axial images of the head were obtained without IV contrast administration.  Coronal and sagittal reconstructions were provided.  Three dimensional and MIP images were obtained and evaluated.  Total DLP was 1123 mGy-cm. Dose lowering technique and automated exposure control were utilized for this exam.    COMPARISON:  None    FINDINGS:  There is normal brain formation.  There is normal gray-white matter differentiation.  There is no hemorrhage, hydrocephalus, or midline shift.  There is no cytotoxic or vasogenic edema.  There is no intra or extra-axial fluid collection.  There is no herniation.    There is a small right frontal scalp hematoma.  The underlying calvarium is intact.  There is no calvarial fracture.  The bilateral orbits are normal.  The paranasal sinuses and mastoid air cells are normally developed and free of disease.                        Preliminary result by  Nicholas Villalpando MD (06/01/24 22:17:36)                   Impression:    1. There is mild soft tissue swelling with hyperdense component in the right frontal scalp and supraorbital region denoting contusion hematoma.  2. No acute intracranial traumatic injury identified. Details and other findings as noted above.               Narrative:    START OF REPORT:  Technique: CT of the head was performed without intravenous contrast with direct axial as well as coronal and sagittal reconstruction images.    Comparison: None.    Clinical history: Lv 2 trauma- GL fall, + thinners, head, neck pain.    FINDINGS:  Hemorrhage: No acute intracranial hemorrhage is seen.  CSF spaces: The ventricles, sulci and basal cisterns all appear mildly prominent global cerebral atrophy.  Brain parenchyma: Unremarkable with preservation of the grey white junction throughout.  Cerebellum: Unremarkable.  Vascular: Unremarkable venous sinuses. Subtle scattered atheromatous calcification of the intracranial arteries is seen.  Sella and skull base: Unremarkable.  Cerebellopontine angles: Normal.  Herniation: None.  Intracranial calcifications: Incidental note is made of bilateral choroid plexus calcification. Incidental note is made of some pineal region calcification.  Calvarium: No acute linear or depressed skull fracture is seen.  Scalp: There is mild soft tissue swelling with hyperdense component in the right frontal scalp and supraorbital region denoting contusion hematoma.    Maxillofacial Structures:  Paranasal sinuses: The visualized paranasal sinuses appear clear with no mucoperiosteal thickening or air fluid levels identified.  Orbits: Unremarkable.  Zygomatic arches: Unremarkable.  Temporal bones and mastoids: Unremarkable.  TMJ: The mandibular condyles appear normally placed with respect to the mandibular fossa.                                         X-Ray Chest 1 View (Final result)  Result time 06/01/24 22:47:28      Final result by  Jose Ontiveros MD (06/01/24 22:47:28)                   Impression:      No acute disease is seen      Electronically signed by: Jose Ontiveros MD  Date:    06/01/2024  Time:    22:47               Narrative:    EXAMINATION:  XR CHEST 1 VIEW    CLINICAL HISTORY:  r/o bleeding or hemorrhage;    TECHNIQUE:  Single frontal view of the chest was performed.    COMPARISON:  None    FINDINGS:  Lungs are clear.  Heart size is within normal limits.  Costophrenic angles are clear.                                       X-Ray Pelvis Routine AP (Final result)  Result time 06/01/24 22:54:51      Final result by Jose Ontiveros MD (06/01/24 22:54:51)                   Impression:      Several metallic densities overlying the pelvis.  No fractures are seen      Electronically signed by: Jose Ontiveros MD  Date:    06/01/2024  Time:    22:54               Narrative:    EXAMINATION:  XR PELVIS ROUTINE AP    CLINICAL HISTORY:  r/o bleeding or hemorrhage;    TECHNIQUE:  AP view of the pelvis was performed.    COMPARISON:  None.    FINDINGS:  There are no fractures seen.  There is no dislocation.  There is several metallic densities overlying the pelvis.  I do not know if these are in or on the patient.                                    EKG:       Telemetry:  Sinus Rhythm     Physical Exam  Vitals and nursing note reviewed.   Constitutional:       Appearance: Normal appearance.   HENT:      Head: Normocephalic.      Mouth/Throat:      Mouth: Mucous membranes are moist.      Pharynx: Oropharynx is clear.   Cardiovascular:      Rate and Rhythm: Normal rate and regular rhythm.      Heart sounds: Normal heart sounds.   Pulmonary:      Effort: Pulmonary effort is normal. No respiratory distress.      Breath sounds: Normal breath sounds. No wheezing or rales.   Abdominal:      Palpations: Abdomen is soft.   Musculoskeletal:         General: Normal range of motion.      Cervical back: Neck supple.   Skin:     General: Skin is warm and dry.    Neurological:      General: No focal deficit present.      Mental Status: She is alert and oriented to person, place, and time. Mental status is at baseline.   Psychiatric:         Mood and Affect: Affect is tearful.         Behavior: Behavior normal. Behavior is cooperative.       Home Medications:   No current facility-administered medications on file prior to encounter.     Current Outpatient Medications on File Prior to Encounter   Medication Sig Dispense Refill    cloNIDine (CATAPRES) 0.1 MG tablet Take 0.05 mg by mouth 2 (two) times daily.      clopidogreL (PLAVIX) 75 mg tablet Take 75 mg by mouth once daily.      divalproex (DEPAKOTE) 250 MG EC tablet Take 250 mg by mouth 2 (two) times a day.      doxepin (SINEQUAN) 25 MG capsule Take 25 mg by mouth every evening.      lumateperone (CAPLYTA) 21 mg Cap Take 21 mg by mouth.      multivitamin (THERAGRAN) per tablet Take 1 tablet by mouth once daily.      thiamine mononitrate, vit B1, (VITAMIN B-1, MONONITRATE,) 100 mg Tab Take 100 mg by mouth once daily.       Current Inpatient Medications:    Current Facility-Administered Medications:     acetaminophen tablet 1,000 mg, 1,000 mg, Oral, Q6H PRN, Renetta Rodriguez, FNP, 1,000 mg at 06/03/24 0916    aluminum-magnesium hydroxide-simethicone 200-200-20 mg/5 mL suspension 30 mL, 30 mL, Oral, QID PRN, Renetta Rodriguez, FNP    bisacodyL suppository 10 mg, 10 mg, Rectal, Daily PRN, Renetta Rodriguez FNP    cefTRIAXone (Rocephin) 1 g in dextrose 5 % in water (D5W) 100 mL IVPB (MB+), 1 g, Intravenous, Q24H, Susan Baltazar MD, Stopped at 06/02/24 1410    cloNIDine tablet 0.1 mg, 0.1 mg, Oral, BID, Renetta Rodriguez FNP, 0.1 mg at 06/03/24 0916    dextrose 10% bolus 125 mL 125 mL, 12.5 g, Intravenous, PRN, Renetta Rodriguez, FNP    dextrose 10% bolus 250 mL 250 mL, 25 g, Intravenous, PRN, Renetta Rodriguez, FNP    divalproex EC tablet 250 mg, 250 mg, Oral, BID, Renetta Rodriguez, [facility], 250 mg at 06/03/24 0916    doxepin  capsule 25 mg, 25 mg, Oral, QHS, Renetta Rodriguez L, FNP, 25 mg at 06/02/24 2116    glucagon (human recombinant) injection 1 mg, 1 mg, Intramuscular, PRN, Renetta Rodriguez L, FNP    glucose chewable tablet 16 g, 16 g, Oral, PRN, Jennifer, Renetta L, FNP    glucose chewable tablet 24 g, 24 g, Oral, PRN, Renetta Rodriguez L, FNP    HYDROcodone-acetaminophen 5-325 mg per tablet 1 tablet, 1 tablet, Oral, Q8H PRN, Susan Baltazar MD, 1 tablet at 06/03/24 1224    hydrOXYzine pamoate capsule 50 mg, 50 mg, Oral, Q4H PRN, Renetta Rodriguez L, FNP    lumateperone Cap 21 mg, 21 mg, Oral, Daily, Renetta Rodriguez L, FNP    melatonin tablet 6 mg, 6 mg, Oral, Nightly PRN, Renetta Rodriguez L, FNP    mirtazapine tablet 15 mg, 15 mg, Oral, Nightly PRN, Renetta Rodriguez L, FNP    multivitamin tablet, 1 tablet, Oral, Daily, Renetta Rodriguez L, FNP, 1 tablet at 06/03/24 0916    naloxone 0.4 mg/mL injection 0.02 mg, 0.02 mg, Intravenous, PRN, Renetta Rodriguez L, FNP    OLANZapine zydis disintegrating tablet 10 mg, 10 mg, Oral, Q2H PRN, Renetta Rodriguez L, FNP    ondansetron injection 4 mg, 4 mg, Intravenous, Q4H PRN, Renetta Rodriguez L, FNP, 4 mg at 06/03/24 0928    senna-docusate 8.6-50 mg per tablet 1 tablet, 1 tablet, Oral, BID PRN, Renetta Rodriguez L, FNP    sodium chloride 0.9% flush 10 mL, 10 mL, Intravenous, PRN, Renetta Rodriguez L, FNP    thiamine tablet 100 mg, 100 mg, Oral, Daily, Renetta Rodriguez L, FNP, 100 mg at 06/03/24 0916  VTE Risk Mitigation (From admission, onward)      None          Assessment:   Syncope & Collapse likely Related to Orthostatic Hypotension (Repeat Orthostatics Negative on 6.3.24)    - Carotid US Negative  Orthostatic Hypotension  LVSD    - EF 45-50%  Fall  Facial Contusions & Hematoma Related to Fall  Polysubstance Abuse    - History of Polysubstance Abuse Including Methamphetamine & Heroin (For 3 Years According to Patient Report)  UTI    - On Rocephin at Current Time   Hyperkalemia (Mild)  History of Pulmonary  Embolism  Migraines  Thyroid Cancer    - Status Post Partial Thyroidectomy  Restless Leg Syndrome  Chronic Neck Pain  Shellfish Allergy    Plan:   Check Orthostatic VS  Change Clonidine to PRN- Hold off on Antihypertensives for now   Recommend IV Fluid Resuscitation  Patient advised on being slow with position changes. She wishes to hold off on compression stockings as they hurt her legs.  Patient scheduled to be transferred to [facility] from [facility].    Thank you for your consult.     Xavi Lynn, LENA  Cardiology  [facility]  06/03/2024     Physician addendum:  I have seen and examined this patient as a split-shared visit with the KETAN d/t complicated medical management of above problems written in assessment and high acuity requiring physician expertise in medical decision-making. I performed the substantive portion of the history and exam. Above medical decision-making is also formulated by me.

## 2024-06-03 NOTE — PLAN OF CARE
Pt here from [facility] [address] [place] [address].  I spoke to Vita at [facility].  Following info.  provided to him.  Results of labs and CT and xray, clinicals.  States for pt to return to [facility] at above address. Clinicals sent and pt, will return per SPD.

## 2024-06-03 NOTE — PLAN OF CARE
Problem: Adult Inpatient Plan of Care  Goal: Plan of Care Review  Outcome: Progressing  Goal: Patient-Specific Goal (Individualized)  Outcome: Progressing  Goal: Absence of Hospital-Acquired Illness or Injury  Outcome: Progressing  Goal: Optimal Comfort and Wellbeing  Outcome: Progressing  Goal: Readiness for Transition of Care  Outcome: Progressing     Problem: Pain Acute  Goal: Optimal Pain Control and Function  Outcome: Progressing     Problem: Fall Injury Risk  Goal: Absence of Fall and Fall-Related Injury  Outcome: Progressing

## 2024-06-03 NOTE — PT/OT/SLP EVAL
Physical Therapy Evaluation and Discharge Note    Patient Name:  Claudia Villasenor   MRN:  [id number]    Recommendations:     Discharge therapy intensity: No Therapy Indicated   Discharge Equipment Recommendations: none   Barriers to discharge: None    Assessment:     Claudia Villasenor is a 54 y.o. female admitted with a medical diagnosis of syncope. .  At this time, patient is functioning at their prior level of function and does not require further acute PT services.     Recent Surgery: * No surgery found *      Plan:     During this hospitalization, patient does not require further acute PT services.  Please re-consult if situation changes.      Subjective     Chief Complaint: none  Patient/Family Comments/goals: none  Pain/Comfort:  Pain Rating 1: 0/10    Patients cultural, spiritual, Orthodoxy conflicts given the current situation: no    Living Environment:  Homeless  Prior to admission, patients level of function was independent.  Equipment used at home: none.  DME owned (not currently used): none.  Upon discharge, patient will have assistance from no one.    Objective:     Communicated with nurse prior to session.  Patient found supine with telemetry upon PT entry to room.    General Precautions: Standard,      Orthopedic Precautions:N/A   Braces: N/A  Respiratory Status: Room air    Exams:  Cognitive Exam:  Patient is oriented to Person, Place, Time, and Situation  Sensation: -       Intact  RLE ROM: WFL  RLE Strength: WFL  LLE ROM: WFL  LLE Strength: WFL    Functional Mobility:  Bed Mobility:  Supine to Sit: independence  Sit to Supine: independence  Transfers:  Sit to Stand:  independence with no AD  Gait: 45 ft independently  Balance: good    AM-PAC 6 CLICK MOBILITY  Total Score:24     Education Provided:  Role and goals of PT, transfer training, bed mobility, gait training, balance training, safety awareness, assistive device, strengthening exercises, and importance of participating in PT to return to  [facility].    Patient left right sidelying with all lines intact and call button in reach.    GOALS:   Multidisciplinary Problems       Physical Therapy Goals       Not on file                    History:     No past medical history on file.    No past surgical history on file.    Time Tracking:     PT Received On: 06/03/24  PT Start Time: 0930     PT Stop Time: 0940  PT Total Time (min): 10 min     Billable Minutes: Evaluation 10 minutes      06/03/2024

## 2024-06-03 NOTE — NURSING
Nurses Note -- 4 Eyes      6/3/2024   3:59 PM      Skin assessed during: Admit      [x] No Altered Skin Integrity Present    []Prevention Measures Documented      [] Yes- Altered Skin Integrity Present or Discovered   [] LDA Added if Not in Epic (Describe Wound)   [] New Altered Skin Integrity was Present on Admit and Documented in LDA   [] Wound Image Taken    Wound Care Consulted? No    Attending Nurse:  Rosario Chavira RN/Staff Member:   Jami

## 2024-06-03 NOTE — PROGRESS NOTES
[facility] Hospital Medicine Progress Note        Chief Complaint: Inpatient Follow-up for syncope and loss of consciousness.  Trauma    HPI: Claudia Villasenor is a 54 y.o. female with a PMHx of polysubstance abuse including methamphetamine and heroin, chronic neck pain, RLS, thyroid cancer s/p partial thyroidectomy, migraine and history of PE who presented to [facility] on 6/1/2024 via EMS from [facility] as a level 2 trauma activation following a fall that occurred prior to arrival.  Patient reportedly standing trying to get to her night medications when she spontaneously lost consciousness and fell face forward, unresponsive for 2 minutes.  Patient endorsed neck pain, shoulder pain, headache, dizziness and back pain following the fall.  Noted to be on Plavix.     Vital Signs upon presentation to the ED included /86, , RR 27, SpO2 99% on room air, temperature 98.5° F.  Labs notable for chloride 111, glucose 124.  Troponin undetectable, serum alcohol undetectable.  Lactic acid unremarkable.  CT head without contrast demonstrated mild soft tissue swelling with hyperdense component in the right frontal scalp and supraorbital region done noting contusion hematoma, no acute intracranial traumatic injury identified.  CT C-spine without contrast unremarkable.  CXR unremarkable.  Pelvic x-ray demonstrated similar overall metallic densities overlying the pelvis, no fracture seen.  CT chest abdomen and pelvis with IV contrast unremarkable.  She was seen fentanyl 100 mcg IV, Zofran 4 mg IV and IV fluids in the ED. she was evaluated by Trauma Service and deemed appropriate for Medicine admission.  Admitted to hospital medicine service for further workup and management of care.      Syncope workup ordered, Plavix help with a plan to resume 3-5 days based on the size of hematoma.  Suspected UTI-on ceftriaxone    Interval Hx:   No overnight events reported by the staff.   Syncope workup reviewed so far, cardiology was consulted.  PT evaluation was ordered    Patient was seen and examined, Continues to complain of intermittent headaches and intermittent palpitations.  No other issues reported.  Chart was reviewed, afebrile, hemodynamically stable, most recent lab work reviewed.  Noted hyperkalemia-5.2    Case was discussed with patient's nurse and  on the floor.    Objective/physical exam:  General: In no acute distress, afebrile  HEENT:  With a hematoma  Chest: Clear to auscultation bilaterally  Heart:  +S1, S2,  Abdomen: Soft, nontender, BS +  MSK: Warm, no lower extremity edema, no clubbing or cyanosis  Neurologic: Alert and oriented, able to move all extremities with good strength    VITAL SIGNS: 24 HRS MIN & MAX LAST   Temp  Min: 97.8 °F (36.6 °C)  Max: 98.8 °F (37.1 °C) 98.4 °F (36.9 °C)   BP  Min: 103/64  Max: 149/85 119/79   Pulse  Min: 74  Max: 91  84   Resp  Min: 16  Max: 22 16   SpO2  Min: 98 %  Max: 100 % 100 %     I have reviewed the following labs:  Recent Labs   Lab 06/01/24 2151   WBC 5.79   RBC 4.60   HGB 14.2   HCT 41.7   MCV 90.7   MCH 30.9   MCHC 34.1   RDW 13.6      MPV 9.0     Recent Labs   Lab 06/01/24 2151 06/03/24  0428    142   K 4.0 5.2*   * 111*   CO2 22 22   BUN 15.4 17.3   CREATININE 0.92 0.87   CALCIUM 9.0 8.7   MG 2.20 2.30   ALBUMIN 3.5 3.4*   ALKPHOS 86 79   ALT 11 11   AST 14 19   BILITOT 0.5 0.5     Microbiology Results (last 7 days)       Procedure Component Value Units Date/Time    Urine culture [[id number]] Collected: 06/02/24 0900    Order Status: Completed Specimen: Urine Updated: 06/03/24 0732     Urine Culture No Significant Growth    Blood Culture [[id number]] Collected: 06/02/24 1300    Order Status: Resulted Specimen: Blood from Arm, Left Updated: 06/02/24 1404    Blood Culture [[id number]] Collected: 06/02/24 1300    Order Status: Resulted Specimen: Blood from Arm, Right Updated: 06/02/24 5406              See below for Radiology    Scheduled Med:   cefTRIAXone (Rocephin) IV (PEDS and ADULTS)  1 g Intravenous Q24H    cloNIDine  0.1 mg Oral BID    divalproex  250 mg Oral BID    doxepin  25 mg Oral QHS    lumateperone  21 mg Oral Daily    multivitamin  1 tablet Oral Daily    thiamine  100 mg Oral Daily      Continuous Infusions:     PRN Meds:    Current Facility-Administered Medications:     acetaminophen, 1,000 mg, Oral, Q6H PRN    aluminum-magnesium hydroxide-simethicone, 30 mL, Oral, QID PRN    bisacodyL, 10 mg, Rectal, Daily PRN    dextrose 10%, 12.5 g, Intravenous, PRN    dextrose 10%, 25 g, Intravenous, PRN    glucagon (human recombinant), 1 mg, Intramuscular, PRN    glucose, 16 g, Oral, PRN    glucose, 24 g, Oral, PRN    HYDROcodone-acetaminophen, 1 tablet, Oral, Q8H PRN    hydrOXYzine pamoate, 50 mg, Oral, Q4H PRN    melatonin, 6 mg, Oral, Nightly PRN    mirtazapine, 15 mg, Oral, Nightly PRN    naloxone, 0.02 mg, Intravenous, PRN    OLANZapine zydis, 10 mg, Oral, Q2H PRN    ondansetron, 4 mg, Intravenous, Q4H PRN    senna-docusate 8.6-50 mg, 1 tablet, Oral, BID PRN    sodium chloride 0.9%, 10 mL, Intravenous, PRN     Assessment/Plan:  Fall.    Facial contusions and hematoma from fall  Syncope and reported collapse  Polysubstance use  UTI  Hyperkalemia  PMHx of polysubstance abuse including methamphetamine and heroin, chronic neck pain, RLS, thyroid cancer s/p partial thyroidectomy, migraine and history of PE       Plan   Analgesics p.r.n. fall precautions. Hold antiplatelets/anticoags if any, can resume in 3-5 days if hematoma stable  Syncope workup reviewed,  EKG pending to be uploaded, monitoring on telemetry, consulted cardiology as she reported collapse collapse and complain of palpitations.Monitor electrolytes.  Keep K above 4, Mag about 2.  Has back pain, initiated ceftriaxone for antibiotics-6/2, follow up urine culture and blood cultures-so far negative.Monitor fever curve and WBC.  Continue  supportive care and appropriate home medications   Encourage ambulation.  PT evaluation ordered    VTE prophylaxis:  SCDs      Anticipated discharge and Disposition:   To be decided, likely in the next 24 hours, pending PT and cardiology eval and  when patient's clinically improving and upon finalization of cultures    All diagnosis and differential diagnosis have been reviewed; assessment and plan has been documented; I have personally reviewed the labs and test results that are presently available; I have reviewed the patients medication list; I have reviewed the consulting providers response and recommendations. I have reviewed or attempted to review medical records based upon their availability    All of the patient's questions have been  addressed and answered. Patient's is agreeable to the above stated plan. I will continue to monitor closely and make adjustments to medical management as needed.  _____________________________________________________________________    Nutrition Status:    Radiology:  I have personally reviewed the following imaging and agree with the radiologist.     Echo    Left Ventricle: The left ventricle is normal in size. Normal wall   thickness. There is mildly reduced systolic function with a visually   estimated ejection fraction of 45 - 50%. Grade I diastolic dysfunction.    Right Ventricle: Normal right ventricular cavity size. Systolic   function is normal. TAPSE is 2.06 cm.    Aortic Valve: The aortic valve is structurally normal.    Mitral Valve: The mitral valve is structurally normal. There is trace   regurgitation.    Tricuspid Valve: The tricuspid valve is structurally normal.    Pulmonic Valve: There is trace regurgitation.    IVC/SVC: Normal venous pressure at 3 mmHg.    Pericardium: There is no pericardial effusion.  CT Cervical Spine Without Contrast  Narrative: EXAMINATION:  CT CERVICAL SPINE WITHOUT CONTRAST    CLINICAL HISTORY:  Trauma;    TECHNIQUE:  Axial images of  the cervical spine were obtained without IV contrast administration.  Coronal and sagittal reconstructions were provided.  Three dimensional and MIP images were obtained and evaluated.  Total DLP was 1123 mGy-cm. Dose lowering technique and automated exposure control were utilized for this exam.    COMPARISON:  None    FINDINGS:  There are postoperative changes following anterior fusion from C3 to C6.  There is auto fusion of the C6 and C7 vertebral bodies.  There is 2 mm of anterolisthesis of C7 on T1.  There is multilevel degenerative facet arthropathy most prominent at C6-C7.  The included portions of the posterior fossa are normal.  The craniocervical junction is symmetric.  The atlantoaxial articulation is normal.  The posterior elements are well aligned and intact.  There is no fracture.    The airway is widely patent.  There is no cervical lymphadenopathy.  There are postoperative changes following left hemithyroidectomy.  The visualized lung apices are clear.  Impression: Postoperative changes following anterior fusion from C3-C6 with auto fusion of C6 and C7 and grade 1 anterolisthesis of C7 on T1.  There is no posttraumatic abnormality of the cervical spine.    [employer] concordance    Electronically signed by: Esau Veliz MD  Date:    06/02/2024  Time:    09:53  CT Head Without Contrast  Narrative: EXAMINATION:  CT HEAD WITHOUT CONTRAST    CLINICAL HISTORY:  Trauma;    TECHNIQUE:  Axial images of the head were obtained without IV contrast administration.  Coronal and sagittal reconstructions were provided.  Three dimensional and MIP images were obtained and evaluated.  Total DLP was 1123 mGy-cm. Dose lowering technique and automated exposure control were utilized for this exam.    COMPARISON:  None    FINDINGS:  There is normal brain formation.  There is normal gray-white matter differentiation.  There is no hemorrhage, hydrocephalus, or midline shift.  There is no cytotoxic or vasogenic edema.  There is  no intra or extra-axial fluid collection.  There is no herniation.    There is a small right frontal scalp hematoma.  The underlying calvarium is intact.  There is no calvarial fracture.  The bilateral orbits are normal.  The paranasal sinuses and mastoid air cells are normally developed and free of disease.  Impression: Right frontal scalp hematoma without underlying fracture or acute intracranial abnormality.    [employer] concordance    Electronically signed by: Esau Veliz MD  Date:    06/02/2024  Time:    09:16  CT Chest Abdomen Pelvis With IV Contrast (XPD) NO Oral Contrast  Narrative: EXAMINATION:  CT CHEST ABDOMEN PELVIS WITH IV CONTRAST (XPD)    CLINICAL HISTORY:  Trauma;    TECHNIQUE:  Axial images of the chest abdomen and pelvis were obtained with IV contrast administration.  Coronal and sagittal reconstructions were provided.  Three dimensional and MIP images were obtained and evaluated.  Total DLP was 390 mGy-cm. Dose lowering technique and automated exposure control were utilized for this exam.    COMPARISON:  None    FINDINGS:  There is no traumatic aortic injury.  There is no active extravasation.  There is no pneumothorax.  There is no free fluid in the pelvis.    The airway is widely patent.  There is no mediastinal or hilar lymphadenopathy.  There is no central pulmonary embolus.  The heart is normal in size.  The lungs are clear.  There is no pulmonary contusion or laceration.  There is no pleural effusion.  There is no hemothorax.  There is no pulmonary nodule or mass.  There is no ground-glass or reticulonodular airspace opacity.    The liver, spleen, pancreas, adrenal glands, and kidneys are normal.  There is no solid organ laceration.  The gallbladder is absent.  The stomach and small bowel are decompressed.  The appendix is normal.  The colon is normal.  The uterus and adnexa are normal for age.  The urinary bladder is decompressed.  There is no pelvic or retroperitoneal lymphadenopathy.  The  aorta is nonaneurysmal.  There is no lytic or blastic osseous lesion.  There is no fracture.  Impression: No posttraumatic abnormality of the chest abdomen and pelvis.    [employer]hawk concordance    Electronically signed by: Esau Veliz MD  Date:    06/02/2024  Time:    07:57      Susan Baltazar MD  Department of Hospital Medicine   [facility]   06/03/2024

## 2024-06-03 NOTE — PLAN OF CARE
CONOR Gann completed with patient over the phone. She states that she has [employer] medicare, will sent email to access for notification. Email sent to [employer] for delivery.

## 2024-06-04 VITALS
DIASTOLIC BLOOD PRESSURE: 82 MMHG | HEIGHT: 61 IN | OXYGEN SATURATION: 100 % | SYSTOLIC BLOOD PRESSURE: 143 MMHG | HEART RATE: 81 BPM | TEMPERATURE: 98 F | BODY MASS INDEX: 30.78 KG/M2 | WEIGHT: 163 LBS | RESPIRATION RATE: 18 BRPM

## 2024-06-04 PROBLEM — R55 SYNCOPE AND COLLAPSE: Status: ACTIVE | Noted: 2024-06-04

## 2024-06-04 PROBLEM — E87.5 HYPERKALEMIA: Status: ACTIVE | Noted: 2024-06-04

## 2024-06-04 PROBLEM — F19.10 POLYSUBSTANCE ABUSE: Status: ACTIVE | Noted: 2024-06-04

## 2024-06-04 LAB
ANION GAP SERPL CALC-SCNC: 6 MEQ/L
BACTERIA UR CULT: NORMAL
BUN SERPL-MCNC: 14.8 MG/DL (ref 9.8–20.1)
CALCIUM SERPL-MCNC: 9 MG/DL (ref 8.4–10.2)
CHLORIDE SERPL-SCNC: 110 MMOL/L (ref 98–107)
CO2 SERPL-SCNC: 28 MMOL/L (ref 22–29)
CREAT SERPL-MCNC: 0.9 MG/DL (ref 0.55–1.02)
CREAT/UREA NIT SERPL: 16
GFR SERPLBLD CREATININE-BSD FMLA CKD-EPI: >60 ML/MIN/1.73/M2
GLUCOSE SERPL-MCNC: 90 MG/DL (ref 74–100)
MAGNESIUM SERPL-MCNC: 2.3 MG/DL (ref 1.6–2.6)
PHOSPHATE SERPL-MCNC: 4.4 MG/DL (ref 2.3–4.7)
POTASSIUM SERPL-SCNC: 4.6 MMOL/L (ref 3.5–5.1)
SODIUM SERPL-SCNC: 144 MMOL/L (ref 136–145)

## 2024-06-04 PROCEDURE — 83735 ASSAY OF MAGNESIUM: CPT | Performed by: INTERNAL MEDICINE

## 2024-06-04 PROCEDURE — 63600175 PHARM REV CODE 636 W HCPCS: Performed by: NURSE PRACTITIONER

## 2024-06-04 PROCEDURE — 80048 BASIC METABOLIC PNL TOTAL CA: CPT | Performed by: INTERNAL MEDICINE

## 2024-06-04 PROCEDURE — 25000003 PHARM REV CODE 250: Performed by: INTERNAL MEDICINE

## 2024-06-04 PROCEDURE — 84100 ASSAY OF PHOSPHORUS: CPT | Performed by: INTERNAL MEDICINE

## 2024-06-04 PROCEDURE — 25000003 PHARM REV CODE 250: Performed by: NURSE PRACTITIONER

## 2024-06-04 PROCEDURE — 36415 COLL VENOUS BLD VENIPUNCTURE: CPT | Performed by: INTERNAL MEDICINE

## 2024-06-04 RX ADMIN — HYDROCODONE BITARTRATE AND ACETAMINOPHEN 1 TABLET: 5; 325 TABLET ORAL at 07:06

## 2024-06-04 RX ADMIN — ONDANSETRON 4 MG: 2 INJECTION INTRAMUSCULAR; INTRAVENOUS at 07:06

## 2024-06-04 RX ADMIN — THERA TABS 1 TABLET: TAB at 07:06

## 2024-06-04 RX ADMIN — DIVALPROEX SODIUM 250 MG: 250 TABLET, DELAYED RELEASE ORAL at 07:06

## 2024-06-04 RX ADMIN — THIAMINE HCL TAB 100 MG 100 MG: 100 TAB at 07:06

## 2024-06-04 NOTE — HOSPITAL COURSE
6/4/24-Patient is resting in the bed.  She will go back to the psych unit today.  Besides some mild pain to head from fall no other issues.  Strongly advised to stop abusing drugs.  She is stable for discharge today.  Exam(A&O, NAD, RRR, CTA, BS +, NTTP, ROM I)

## 2024-06-04 NOTE — ASSESSMENT & PLAN NOTE
This patient has hyperkalemia which is controlled. We will monitor for arrhythmias with EKG or continuous telemetry. We will treat the hyperkalemia with Potassium Binders and IV fluids . The likely etiology of the hyperkalemia is  dehydration .  The patients latest potassium has been reviewed and the results are listed below  Recent Labs   Lab 06/04/24  0705   K 4.6

## 2024-06-04 NOTE — PLAN OF CARE
Asessed pt early this am.  She provides following info.  From [facility] has a dgtr.   Named Norma.  Pt. has been homeless for 3 years secondary to meth . Addiction.  Asked what attempt is this at sobriety, stated too many to count.  She has medicare [employer] and is disabled. Pt plans to return to [place]  and then decide what she needs to do

## 2024-06-04 NOTE — HPI
54 y.o. female with a PMHx of polysubstance abuse including methamphetamine and heroin, chronic neck pain, RLS, thyroid cancer s/p partial thyroidectomy, migraine and history of PE who presented to [facility] on 6/1/2024 via EMS from [facility] as a level 2 trauma activation following a fall that occurred prior to arrival.  Patient reportedly standing trying to get to her night medications when she spontaneously lost consciousness and fell face forward, unresponsive for 2 minutes.  Patient endorsed neck pain, shoulder pain, headache, dizziness and back pain following the fall.  Noted to be on Plavix.     Vital Signs upon presentation to the ED included /86, , RR 27, SpO2 99% on room air, temperature 98.5° F.  Labs notable for chloride 111, glucose 124.  Troponin undetectable, serum alcohol undetectable.  Lactic acid unremarkable.  CT head without contrast demonstrated mild soft tissue swelling with hyperdense component in the right frontal scalp and supraorbital region done noting contusion hematoma, no acute intracranial traumatic injury identified.  CT C-spine without contrast unremarkable.  CXR unremarkable.  Pelvic x-ray demonstrated similar overall metallic densities overlying the pelvis, no fracture seen.  CT chest abdomen and pelvis with IV contrast unremarkable.  She was seen fentanyl 100 mcg IV, Zofran 4 mg IV and IV fluids in the ED. she was evaluated by Trauma Service and deemed appropriate for Medicine admission.  Admitted to hospital medicine service for further workup and management of care.       Syncope workup ordered, Plavix help with a plan to resume 3-5 days based on the size of hematoma.  Suspected UTI-on ceftriaxone

## 2024-06-04 NOTE — NURSING
Pt refusing tele all night. Pt c/o no sleep due to constant interactions in room at night and refused midnight vitals.

## 2024-06-04 NOTE — DISCHARGE SUMMARY
[facility] Hospital Medicine  Discharge Summary      Patient Name: Claudia Villasenor  MRN: [id number]  [facility]: [id number]  Patient Class: IP- Inpatient  Admission Date: 6/1/2024  Hospital Length of Stay: 1 days  Discharge Date and Time:  06/04/2024 9:46 AM  Attending Physician: Susan Baltazar MD   Discharging Provider: Hong Navarrete MD  Primary Care Provider: Glory Primary Doctor    Primary Care Team: Networked reference to record PCT     HPI:   54 y.o. female with a PMHx of polysubstance abuse including methamphetamine and heroin, chronic neck pain, RLS, thyroid cancer s/p partial thyroidectomy, migraine and history of PE who presented to [facility] on 6/1/2024 via EMS from [facility] as a level 2 trauma activation following a fall that occurred prior to arrival.  Patient reportedly standing trying to get to her night medications when she spontaneously lost consciousness and fell face forward, unresponsive for 2 minutes.  Patient endorsed neck pain, shoulder pain, headache, dizziness and back pain following the fall.  Noted to be on Plavix.     Vital Signs upon presentation to the ED included /86, , RR 27, SpO2 99% on room air, temperature 98.5° F.  Labs notable for chloride 111, glucose 124.  Troponin undetectable, serum alcohol undetectable.  Lactic acid unremarkable.  CT head without contrast demonstrated mild soft tissue swelling with hyperdense component in the right frontal scalp and supraorbital region done noting contusion hematoma, no acute intracranial traumatic injury identified.  CT C-spine without contrast unremarkable.  CXR unremarkable.  Pelvic x-ray demonstrated similar overall metallic densities overlying the pelvis, no fracture seen.  CT chest abdomen and pelvis with IV contrast unremarkable.  She was seen fentanyl 100 mcg IV, Zofran 4 mg IV and IV fluids in the ED. she was evaluated by Trauma Service and deemed appropriate for Medicine  admission.  Admitted to hospital medicine service for further workup and management of care.       Syncope workup ordered, Plavix help with a plan to resume 3-5 days based on the size of hematoma.  Suspected UTI-on ceftriaxone    * No surgery found *      Hospital Course:   6/4/24-Patient is resting in the bed.  She will go back to the psych unit today.  Besides some mild pain to head from fall no other issues.  Strongly advised to stop abusing drugs.  She is stable for discharge today.  Exam(A&O, NAD, RRR, CTA, BS +, NTTP, ROM I)     Goals of Care Treatment Preferences:  Code Status: Full Code      Consults:   Consults (From admission, onward)          Status Ordering Provider     Inpatient consult to Cardiology  Once        Provider:  Apollo Friend MD    Completed TO SERRANO            Renal/  Hyperkalemia  This patient has hyperkalemia which is controlled. We will monitor for arrhythmias with EKG or continuous telemetry. We will treat the hyperkalemia with Potassium Binders and IV fluids . The likely etiology of the hyperkalemia is  dehydration .  The patients latest potassium has been reviewed and the results are listed below  Recent Labs   Lab 06/04/24  0705   K 4.6             Other  * Syncope and collapse  Monitor vitals  Not orthostatic  Stop abusing drugs      Polysubstance abuse  Advised to stop        Final Active Diagnoses:    Diagnosis Date Noted POA    PRINCIPAL PROBLEM:  Syncope and collapse [R55] 06/04/2024 Yes    Hyperkalemia [E87.5] 06/04/2024 Yes    Polysubstance abuse [F19.10] 06/04/2024 Yes      Problems Resolved During this Admission:       Discharged Condition: stable    Disposition: Psych unit    Follow Up:   Follow-up Information       No, Primary Doctor Follow up.                           Patient Instructions:   No discharge procedures on file.    Significant Diagnostic Studies: Labs: BMP:   Recent Labs   Lab 06/03/24  0428 06/04/24  0705    144   K 5.2* 4.6   * 110*   CO2  22 28   BUN 17.3 14.8   CREATININE 0.87 0.90   CALCIUM 8.7 9.0   MG 2.30 2.30   , CMP   Recent Labs   Lab 06/03/24  0428 06/04/24  0705    144   K 5.2* 4.6   * 110*   CO2 22 28   BUN 17.3 14.8   CREATININE 0.87 0.90   CALCIUM 8.7 9.0   ALBUMIN 3.4*  --    BILITOT 0.5  --    ALKPHOS 79  --    AST 19  --    ALT 11  --    , and CBC   Recent Labs   Lab 06/03/24  0803   WBC 4.36*   HGB 13.5   HCT 41.4          Pending Diagnostic Studies:       None           Medications:  Reconciled Home Medications:      Medication List        CONTINUE taking these medications      CAPLYTA 21 mg Cap  Generic drug: lumateperone  Take 21 mg by mouth.     cloNIDine 0.1 MG tablet  Commonly known as: CATAPRES  Take 0.05 mg by mouth 2 (two) times daily.     clopidogreL 75 mg tablet  Commonly known as: PLAVIX  Take 75 mg by mouth once daily.     DEPAKOTE 250 MG EC tablet  Generic drug: divalproex  Take 250 mg by mouth 2 (two) times a day.     doxepin 25 MG capsule  Commonly known as: SINEQUAN  Take 25 mg by mouth every evening.     multivitamin per tablet  Commonly known as: THERAGRAN  Take 1 tablet by mouth once daily.     thiamine mononitrate (vit B1) 100 mg Tab  Commonly known as: VITAMIN B-1 (MONONITRATE)  Take 100 mg by mouth once daily.              Indwelling Lines/Drains at time of discharge:   Lines/Drains/Airways       None                   Time spent on the discharge of patient: 35 minutes        Patient screened for food insecurity, housing instability, transportation needs, utility difficulties, and interpersonal safety.   Patient is being released to [facility]    Hong Navarrete MD  Department of Hospital Medicine  [facility]

## 2024-06-04 NOTE — PLAN OF CARE
Pt is discharged today to return to [facility] for re-asessment per Vita yesterday.  Today Elissa Can, RN tells me pt. Does not need to return to [place] as she was discharged when she was admitted to [facility].  I informed pt who states she has to go and get her belongings, all of her things are at the [facility].  We will send her by uber to obtain her belongings.  She can discuss her going elsewhere with [place] staff.   Pt was a voluntary admit there.  We will provide her transportation to get to [facility] on [address].

## 2024-06-07 LAB
BACTERIA BLD CULT: NORMAL
BACTERIA BLD CULT: NORMAL